# Patient Record
Sex: FEMALE | Race: BLACK OR AFRICAN AMERICAN | NOT HISPANIC OR LATINO | Employment: OTHER | URBAN - METROPOLITAN AREA
[De-identification: names, ages, dates, MRNs, and addresses within clinical notes are randomized per-mention and may not be internally consistent; named-entity substitution may affect disease eponyms.]

---

## 2024-04-18 ENCOUNTER — APPOINTMENT (OUTPATIENT)
Dept: RADIOLOGY | Facility: CLINIC | Age: 74
DRG: 282 | End: 2024-04-18
Attending: PHYSICIAN ASSISTANT
Payer: MEDICARE

## 2024-04-18 ENCOUNTER — HOSPITAL ENCOUNTER (INPATIENT)
Facility: CLINIC | Age: 74
LOS: 1 days | Discharge: ANOTHER HOSPITAL | DRG: 282 | End: 2024-04-19
Attending: EMERGENCY MEDICINE | Admitting: INTERNAL MEDICINE
Payer: MEDICARE

## 2024-04-18 DIAGNOSIS — I21.4 NSTEMI (NON-ST ELEVATED MYOCARDIAL INFARCTION) (H): Primary | ICD-10-CM

## 2024-04-18 DIAGNOSIS — R07.9 CHEST PAIN, UNSPECIFIED TYPE: ICD-10-CM

## 2024-04-18 DIAGNOSIS — E87.6 HYPOKALEMIA: ICD-10-CM

## 2024-04-18 PROBLEM — M17.11 PRIMARY OSTEOARTHRITIS OF RIGHT KNEE: Status: ACTIVE | Noted: 2018-11-28

## 2024-04-18 LAB
ALBUMIN SERPL BCG-MCNC: 4.1 G/DL (ref 3.5–5.2)
ALP SERPL-CCNC: 53 U/L (ref 40–150)
ALT SERPL W P-5'-P-CCNC: 8 U/L (ref 0–50)
ANION GAP SERPL CALCULATED.3IONS-SCNC: 13 MMOL/L (ref 7–15)
APTT PPP: 28 SECONDS (ref 22–38)
AST SERPL W P-5'-P-CCNC: 21 U/L (ref 0–45)
ATRIAL RATE - MUSE: 70 BPM
BILIRUB DIRECT SERPL-MCNC: <0.2 MG/DL (ref 0–0.3)
BILIRUB SERPL-MCNC: 0.3 MG/DL
BUN SERPL-MCNC: 21.5 MG/DL (ref 8–23)
CALCIUM SERPL-MCNC: 10.4 MG/DL (ref 8.8–10.2)
CHLORIDE SERPL-SCNC: 103 MMOL/L (ref 98–107)
CREAT SERPL-MCNC: 0.88 MG/DL (ref 0.51–0.95)
D DIMER PPP FEU-MCNC: 0.61 UG/ML FEU (ref 0–0.5)
DEPRECATED HCO3 PLAS-SCNC: 26 MMOL/L (ref 22–29)
DIASTOLIC BLOOD PRESSURE - MUSE: 86 MMHG
EGFRCR SERPLBLD CKD-EPI 2021: 70 ML/MIN/1.73M2
ERYTHROCYTE [DISTWIDTH] IN BLOOD BY AUTOMATED COUNT: 13.8 % (ref 10–15)
FLUAV RNA SPEC QL NAA+PROBE: NEGATIVE
FLUBV RNA RESP QL NAA+PROBE: NEGATIVE
GLUCOSE SERPL-MCNC: 114 MG/DL (ref 70–99)
HCT VFR BLD AUTO: 39.4 % (ref 35–47)
HGB BLD-MCNC: 13.3 G/DL (ref 11.7–15.7)
INR PPP: 0.9 (ref 0.85–1.15)
INTERPRETATION ECG - MUSE: NORMAL
LIPASE SERPL-CCNC: 28 U/L (ref 13–60)
MAGNESIUM SERPL-MCNC: 2 MG/DL (ref 1.7–2.3)
MCH RBC QN AUTO: 32 PG (ref 26.5–33)
MCHC RBC AUTO-ENTMCNC: 33.8 G/DL (ref 31.5–36.5)
MCV RBC AUTO: 95 FL (ref 78–100)
P AXIS - MUSE: 73 DEGREES
PLATELET # BLD AUTO: 229 10E3/UL (ref 150–450)
POTASSIUM SERPL-SCNC: 3.2 MMOL/L (ref 3.4–5.3)
PR INTERVAL - MUSE: 180 MS
PROT SERPL-MCNC: 7.5 G/DL (ref 6.4–8.3)
QRS DURATION - MUSE: 90 MS
QT - MUSE: 426 MS
QTC - MUSE: 460 MS
R AXIS - MUSE: -8 DEGREES
RBC # BLD AUTO: 4.15 10E6/UL (ref 3.8–5.2)
RSV RNA SPEC NAA+PROBE: NEGATIVE
SARS-COV-2 RNA RESP QL NAA+PROBE: NEGATIVE
SODIUM SERPL-SCNC: 142 MMOL/L (ref 135–145)
SYSTOLIC BLOOD PRESSURE - MUSE: 182 MMHG
T AXIS - MUSE: -8 DEGREES
TROPONIN T SERPL HS-MCNC: 14 NG/L
TROPONIN T SERPL HS-MCNC: 64 NG/L
TSH SERPL DL<=0.005 MIU/L-ACNC: 0.98 UIU/ML (ref 0.3–4.2)
VENTRICULAR RATE- MUSE: 70 BPM
WBC # BLD AUTO: 5.7 10E3/UL (ref 4–11)

## 2024-04-18 PROCEDURE — 85610 PROTHROMBIN TIME: CPT | Performed by: PHYSICIAN ASSISTANT

## 2024-04-18 PROCEDURE — 99223 1ST HOSP IP/OBS HIGH 75: CPT | Mod: AI | Performed by: INTERNAL MEDICINE

## 2024-04-18 PROCEDURE — 93005 ELECTROCARDIOGRAM TRACING: CPT | Performed by: PHYSICIAN ASSISTANT

## 2024-04-18 PROCEDURE — 82248 BILIRUBIN DIRECT: CPT | Performed by: EMERGENCY MEDICINE

## 2024-04-18 PROCEDURE — 120N000004 HC R&B MS OVERFLOW

## 2024-04-18 PROCEDURE — 36415 COLL VENOUS BLD VENIPUNCTURE: CPT | Performed by: PHYSICIAN ASSISTANT

## 2024-04-18 PROCEDURE — 258N000003 HC RX IP 258 OP 636: Performed by: INTERNAL MEDICINE

## 2024-04-18 PROCEDURE — 83880 ASSAY OF NATRIURETIC PEPTIDE: CPT | Performed by: INTERNAL MEDICINE

## 2024-04-18 PROCEDURE — 85027 COMPLETE CBC AUTOMATED: CPT | Performed by: PHYSICIAN ASSISTANT

## 2024-04-18 PROCEDURE — 71046 X-RAY EXAM CHEST 2 VIEWS: CPT

## 2024-04-18 PROCEDURE — 36415 COLL VENOUS BLD VENIPUNCTURE: CPT | Performed by: EMERGENCY MEDICINE

## 2024-04-18 PROCEDURE — 250N000011 HC RX IP 250 OP 636: Performed by: PHYSICIAN ASSISTANT

## 2024-04-18 PROCEDURE — 80048 BASIC METABOLIC PNL TOTAL CA: CPT | Performed by: PHYSICIAN ASSISTANT

## 2024-04-18 PROCEDURE — 99285 EMERGENCY DEPT VISIT HI MDM: CPT

## 2024-04-18 PROCEDURE — 84484 ASSAY OF TROPONIN QUANT: CPT | Performed by: PHYSICIAN ASSISTANT

## 2024-04-18 PROCEDURE — 87637 SARSCOV2&INF A&B&RSV AMP PRB: CPT | Performed by: PHYSICIAN ASSISTANT

## 2024-04-18 PROCEDURE — 250N000013 HC RX MED GY IP 250 OP 250 PS 637: Performed by: PHYSICIAN ASSISTANT

## 2024-04-18 PROCEDURE — 83735 ASSAY OF MAGNESIUM: CPT | Performed by: EMERGENCY MEDICINE

## 2024-04-18 PROCEDURE — 84484 ASSAY OF TROPONIN QUANT: CPT | Performed by: EMERGENCY MEDICINE

## 2024-04-18 PROCEDURE — 83690 ASSAY OF LIPASE: CPT | Performed by: EMERGENCY MEDICINE

## 2024-04-18 PROCEDURE — 85730 THROMBOPLASTIN TIME PARTIAL: CPT | Performed by: PHYSICIAN ASSISTANT

## 2024-04-18 PROCEDURE — 84443 ASSAY THYROID STIM HORMONE: CPT | Performed by: EMERGENCY MEDICINE

## 2024-04-18 PROCEDURE — 96365 THER/PROPH/DIAG IV INF INIT: CPT

## 2024-04-18 PROCEDURE — 85379 FIBRIN DEGRADATION QUANT: CPT | Performed by: PHYSICIAN ASSISTANT

## 2024-04-18 PROCEDURE — 96376 TX/PRO/DX INJ SAME DRUG ADON: CPT

## 2024-04-18 RX ORDER — LISINOPRIL AND HYDROCHLOROTHIAZIDE 12.5; 2 MG/1; MG/1
2 TABLET ORAL DAILY
Status: DISCONTINUED | OUTPATIENT
Start: 2024-04-19 | End: 2024-04-19 | Stop reason: HOSPADM

## 2024-04-18 RX ORDER — LIDOCAINE 40 MG/G
CREAM TOPICAL
Status: DISCONTINUED | OUTPATIENT
Start: 2024-04-18 | End: 2024-04-19 | Stop reason: HOSPADM

## 2024-04-18 RX ORDER — METOPROLOL TARTRATE 25 MG/1
25 TABLET, FILM COATED ORAL ONCE
Status: DISCONTINUED | OUTPATIENT
Start: 2024-04-18 | End: 2024-04-18

## 2024-04-18 RX ORDER — CALCIUM CARBONATE 500 MG/1
1000 TABLET, CHEWABLE ORAL 4 TIMES DAILY PRN
Status: DISCONTINUED | OUTPATIENT
Start: 2024-04-18 | End: 2024-04-19 | Stop reason: HOSPADM

## 2024-04-18 RX ORDER — POTASSIUM CHLORIDE 1500 MG/1
40 TABLET, EXTENDED RELEASE ORAL ONCE
Status: COMPLETED | OUTPATIENT
Start: 2024-04-18 | End: 2024-04-18

## 2024-04-18 RX ORDER — AMLODIPINE BESYLATE 5 MG/1
5 TABLET ORAL DAILY
Status: DISCONTINUED | OUTPATIENT
Start: 2024-04-19 | End: 2024-04-19 | Stop reason: HOSPADM

## 2024-04-18 RX ORDER — AMOXICILLIN 250 MG
2 CAPSULE ORAL 2 TIMES DAILY PRN
Status: DISCONTINUED | OUTPATIENT
Start: 2024-04-18 | End: 2024-04-19 | Stop reason: HOSPADM

## 2024-04-18 RX ORDER — ASPIRIN 81 MG/1
324 TABLET, CHEWABLE ORAL ONCE
Status: COMPLETED | OUTPATIENT
Start: 2024-04-18 | End: 2024-04-18

## 2024-04-18 RX ORDER — HEPARIN SODIUM 10000 [USP'U]/100ML
0-5000 INJECTION, SOLUTION INTRAVENOUS CONTINUOUS
Status: DISCONTINUED | OUTPATIENT
Start: 2024-04-18 | End: 2024-04-19 | Stop reason: HOSPADM

## 2024-04-18 RX ORDER — VITAMIN B COMPLEX
1000 TABLET ORAL
COMMUNITY
End: 2024-04-18

## 2024-04-18 RX ORDER — ASPIRIN 81 MG/1
162 TABLET, CHEWABLE ORAL DAILY
Status: DISCONTINUED | OUTPATIENT
Start: 2024-04-19 | End: 2024-04-19 | Stop reason: HOSPADM

## 2024-04-18 RX ORDER — AMLODIPINE BESYLATE 5 MG/1
1 TABLET ORAL DAILY
COMMUNITY
Start: 2024-02-27

## 2024-04-18 RX ORDER — LANOLIN ALCOHOL/MO/W.PET/CERES
CREAM (GRAM) TOPICAL
COMMUNITY

## 2024-04-18 RX ORDER — MELOXICAM 7.5 MG/1
7.5 TABLET ORAL DAILY PRN
Status: ON HOLD | COMMUNITY
Start: 2024-02-27 | End: 2024-04-20

## 2024-04-18 RX ORDER — SODIUM CHLORIDE, SODIUM LACTATE, POTASSIUM CHLORIDE, CALCIUM CHLORIDE 600; 310; 30; 20 MG/100ML; MG/100ML; MG/100ML; MG/100ML
INJECTION, SOLUTION INTRAVENOUS CONTINUOUS
Status: DISCONTINUED | OUTPATIENT
Start: 2024-04-18 | End: 2024-04-19 | Stop reason: HOSPADM

## 2024-04-18 RX ORDER — LISINOPRIL AND HYDROCHLOROTHIAZIDE 12.5; 2 MG/1; MG/1
2 TABLET ORAL DAILY
COMMUNITY
Start: 2024-02-27

## 2024-04-18 RX ORDER — NITROGLYCERIN 0.4 MG/1
0.4 TABLET SUBLINGUAL EVERY 5 MIN PRN
Status: DISCONTINUED | OUTPATIENT
Start: 2024-04-18 | End: 2024-04-19 | Stop reason: HOSPADM

## 2024-04-18 RX ORDER — ACETAMINOPHEN 650 MG/1
650 SUPPOSITORY RECTAL EVERY 4 HOURS PRN
Status: DISCONTINUED | OUTPATIENT
Start: 2024-04-18 | End: 2024-04-19 | Stop reason: HOSPADM

## 2024-04-18 RX ORDER — LANOLIN ALCOHOL/MO/W.PET/CERES
1000 CREAM (GRAM) TOPICAL DAILY
Status: DISCONTINUED | OUTPATIENT
Start: 2024-04-19 | End: 2024-04-19 | Stop reason: HOSPADM

## 2024-04-18 RX ORDER — ACETAMINOPHEN 325 MG/1
650 TABLET ORAL EVERY 4 HOURS PRN
Status: DISCONTINUED | OUTPATIENT
Start: 2024-04-18 | End: 2024-04-19 | Stop reason: HOSPADM

## 2024-04-18 RX ORDER — AMOXICILLIN 250 MG
1 CAPSULE ORAL 2 TIMES DAILY PRN
Status: DISCONTINUED | OUTPATIENT
Start: 2024-04-18 | End: 2024-04-19 | Stop reason: HOSPADM

## 2024-04-18 RX ADMIN — HEPARIN SODIUM 1100 UNITS/HR: 10000 INJECTION, SOLUTION INTRAVENOUS at 21:08

## 2024-04-18 RX ADMIN — ASPIRIN 81 MG CHEWABLE TABLET 324 MG: 81 TABLET CHEWABLE at 18:09

## 2024-04-18 RX ADMIN — POTASSIUM CHLORIDE 40 MEQ: 1500 TABLET, EXTENDED RELEASE ORAL at 19:08

## 2024-04-18 RX ADMIN — SODIUM CHLORIDE, POTASSIUM CHLORIDE, SODIUM LACTATE AND CALCIUM CHLORIDE: 600; 310; 30; 20 INJECTION, SOLUTION INTRAVENOUS at 23:00

## 2024-04-18 RX ADMIN — METOPROLOL TARTRATE 12.5 MG: 25 TABLET, FILM COATED ORAL at 22:16

## 2024-04-18 ASSESSMENT — ACTIVITIES OF DAILY LIVING (ADL)
ADLS_ACUITY_SCORE: 35
ADLS_ACUITY_SCORE: 35
ADLS_ACUITY_SCORE: 21
ADLS_ACUITY_SCORE: 35
ADLS_ACUITY_SCORE: 35

## 2024-04-18 ASSESSMENT — COLUMBIA-SUICIDE SEVERITY RATING SCALE - C-SSRS
2. HAVE YOU ACTUALLY HAD ANY THOUGHTS OF KILLING YOURSELF IN THE PAST MONTH?: NO
6. HAVE YOU EVER DONE ANYTHING, STARTED TO DO ANYTHING, OR PREPARED TO DO ANYTHING TO END YOUR LIFE?: NO
1. IN THE PAST MONTH, HAVE YOU WISHED YOU WERE DEAD OR WISHED YOU COULD GO TO SLEEP AND NOT WAKE UP?: NO

## 2024-04-18 NOTE — ED PROVIDER NOTES
EMERGENCY DEPARTMENT ENCOUNTER      NAME: Angelica Wright  AGE: 73 year old female  YOB: 1950  MRN: [id number]  EVALUATION DATE & TIME: No admission date for patient encounter.    PCP: No primary care provider on file.        Chief Complaint   Patient presents with    Chest Pain    Shortness of Breath         IMPRESSION  1. NSTEMI (non-ST elevated myocardial infarction) (H)    2. Chest pain, unspecified type    3. Hypokalemia        PLAN  - admit to hospitalist for further care with cardiology consulting; cards tele admit      ED COURSE & MEDICAL DECISION MAKING    ED Course as of 04/18/24 2140   Thu Apr 18, 2024 1921 CXR independently reviewed & interpreted by me: no acute cardiopulmonary process.       6:24 PM I was consulted by Kendy Ontiveros PA-C, on this patient. I reviewed ED presentation history, assessment, management, plan to this point. Please see ED MAX note for details.    Patient seen in the waiting room due to boarding crisis.      Briefly, 73yoF with history of HTN, HLD presenting with about 1 hour of chest pain radiating to left arm; found to have NSTEMI. No ischemic changes on EKG but high-sensitivity troponin increased from 14 to 64; no ongoing chest pain. PE ruled out with age-adjusted d-dimer within normal limits. CXR with no acute abnormality or explanatory pathology. Doubt acute aortic syndrome. Given Aspirin & heparin. Cardiology states can be admitted here at [facility]. Admission to hospitalist arranged per ED PA; please see her note for details.        This patient involved a high degree of complexity in medical decision making, as significant risks were present and assessed. Recent encounters & results in medical record reviewed by me.    All workup (i.e. any EKG/labs/imaging as per charting below) reviewed and independently interpreted by me. See respective sections for details.      I had a face to face encounter with this patient seen by the Advanced Practice Provider  "(MAX). I personally made/approved the management plan and take responsibility for the patient management. I personally saw patient and performed a substantive portion of the visit including all aspects of the medical decision making.    MEDICATIONS GIVEN IN THE EMERGENCY DEPARTMENT  Medications   nitroGLYcerin (NITROSTAT) sublingual tablet 0.4 mg (has no administration in time range)   heparin 25,000 units in 0.45% NaCl 250 mL ANTICOAGULANT infusion (1,100 Units/hr Intravenous $New Bag 4/18/24 2108)   metoprolol tartrate (LOPRESSOR) half-tab 12.5 mg (has no administration in time range)   aspirin (ASA) chewable tablet 324 mg (324 mg Oral $Given 4/18/24 1809)   potassium chloride kal ER (KLOR-CON M20) CR tablet 40 mEq (40 mEq Oral $Given 4/18/24 1908)   heparin loading dose for LOW INTENSITY TREATMENT * Give BEFORE starting heparin infusion (5,500 Units Intravenous $Given 4/18/24 2108)               =================================================================      HPI  Use of : N/A        Angelica Wright is a 73 year old female with a pertinent history of hypertension, hyperlipidemia, and diverticulosis who presents to this ED via private car with  for evaluation of a sudden onset of shortness of breath and chest pain.    Patient endorses a 6/10 substernal \"squeezing and burning, chest pain that radiates to her left hand.  She endorses having associated nausea, shortness of breath, dizziness.    Otherwise in normal state of health. No further concerns at this time.       --------------- MEDICAL HISTORY ---------------  PAST MEDICAL HISTORY:  Reviewed by me.  History reviewed. No pertinent past medical history.  Patient Active Problem List   Diagnosis    Diverticulosis of large intestine    Essential hypertension    Mixed hyperlipidemia    Personal history of colonic polyps    Primary osteoarthritis of right knee    Hypokalemia    NSTEMI (non-ST elevated myocardial infarction) (H)    Chest pain, " unspecified type       PAST SURGICAL HISTORY:  Reviewed by me.  History reviewed. No pertinent surgical history.    CURRENT MEDICATIONS:    Reviewed by me.    Current Facility-Administered Medications:     heparin 25,000 units in 0.45% NaCl 250 mL ANTICOAGULANT infusion, 0-5,000 Units/hr, Intravenous, Continuous, Kendy Ontiveros PA-C, Last Rate: 11 mL/hr at 04/18/24 2108, 1,100 Units/hr at 04/18/24 2108    metoprolol tartrate (LOPRESSOR) half-tab 12.5 mg, 12.5 mg, Oral, Once, Kendy Ontiveros PA-C    nitroGLYcerin (NITROSTAT) sublingual tablet 0.4 mg, 0.4 mg, Sublingual, Q5 Min PRN, Kendy Ontiveros PA-C    Current Outpatient Medications:     amLODIPine (NORVASC) 5 MG tablet, Take 1 tablet by mouth daily In the evening, Disp: , Rfl:     cyanocobalamin (VITAMIN B-12) 1000 MCG tablet, Take by mouth once daily., Disp: , Rfl:     lisinopril-hydrochlorothiazide (ZESTORETIC) 20-12.5 MG tablet, Take 2 tablets by mouth daily In the evening, Disp: , Rfl:     meloxicam (MOBIC) 7.5 MG tablet, Take 7.5 mg by mouth daily as needed for pain, Disp: , Rfl:     ALLERGIES:  Reviewed by me.  No Known Allergies    FAMILY HISTORY:  Reviewed by me.  History reviewed. No pertinent family history.    SOCIAL HISTORY:   Reviewed by me.  Social History     Socioeconomic History    Marital status:      Spouse name: None    Number of children: None    Years of education: None    Highest education level: None         --------------- PHYSICAL EXAM ---------------  Nursing notes and vitals independently reviewed by me.  VITALS:  Vitals:    04/18/24 2000 04/18/24 2015 04/18/24 2045 04/18/24 2100   BP:    (!) 162/77   Pulse: 67 65 63 66   Resp: 26 18 26 23   Temp:       TempSrc:       SpO2: 95% 95% 97% 97%   Weight:       Height:           PHYSICAL EXAM:    General:  alert, interactive, no distress  Eyes:  conjunctivae clear, conjugate gaze  HENT:  atraumatic, nose with no rhinorrhea, oropharynx clear  Neck:   no meningismus  Cardiovascular:  HR 60s during exam, regular rhythm, no murmurs, brisk cap refill  Chest:  no chest wall tenderness  Pulmonary:  no stridor, normal phonation, normal work of breathing, clear lungs bilaterally  Abdomen: soft, nondistended, nontender  :  no CVA tenderness  Back:  no midline spinal tenderness  Musculoskeletal:  no pretibial edema, no calf tenderness. Gross ROM intact to joints of extremities with no obvious deformities.  Skin:  warm, dry, no rash  Neuro:  awake, alert, answers questions appropriately, follows commands, moves all limbs  Psych:  calm, normal affect      --------------- RESULTS ---------------  EKG:    Reviewed and independently interpreted by me.  - NSR at 70bpm, no ST changes, small symmetric TWI in III, normal intervals  - no priors for comparison  My read.    LAB:  Reviewed and independently interpreted by me.  Results for orders placed or performed during the hospital encounter of 04/18/24   Chest XR,  PA & LAT    Impression    IMPRESSION: Negative chest.   CBC (+ platelets, no diff)   Result Value Ref Range    WBC Count 5.7 4.0 - 11.0 10e3/uL    RBC Count 4.15 3.80 - 5.20 10e6/uL    Hemoglobin 13.3 11.7 - 15.7 g/dL    Hematocrit 39.4 35.0 - 47.0 %    MCV 95 78 - 100 fL    MCH 32.0 26.5 - 33.0 pg    MCHC 33.8 31.5 - 36.5 g/dL    RDW 13.8 10.0 - 15.0 %    Platelet Count 229 150 - 450 10e3/uL   Basic metabolic panel   Result Value Ref Range    Sodium 142 135 - 145 mmol/L    Potassium 3.2 (L) 3.4 - 5.3 mmol/L    Chloride 103 98 - 107 mmol/L    Carbon Dioxide (CO2) 26 22 - 29 mmol/L    Anion Gap 13 7 - 15 mmol/L    Urea Nitrogen 21.5 8.0 - 23.0 mg/dL    Creatinine 0.88 0.51 - 0.95 mg/dL    GFR Estimate 70 >60 mL/min/1.73m2    Calcium 10.4 (H) 8.8 - 10.2 mg/dL    Glucose 114 (H) 70 - 99 mg/dL   Result Value Ref Range    INR 0.90 0.85 - 1.15   Result Value Ref Range    aPTT 28 22 - 38 Seconds   Troponin T, High Sensitivity (now)   Result Value Ref Range    Troponin T,  High Sensitivity 14 <=14 ng/L   Symptomatic Influenza A/B, RSV, & SARS-CoV2 PCR (COVID-19) Nasopharyngeal    Specimen: Nasopharyngeal; Swab   Result Value Ref Range    Influenza A PCR Negative Negative    Influenza B PCR Negative Negative    RSV PCR Negative Negative    SARS CoV2 PCR Negative Negative   D dimer quantitative   Result Value Ref Range    D-Dimer Quantitative 0.61 (H) 0.00 - 0.50 ug/mL FEU   Hepatic function panel   Result Value Ref Range    Protein Total 7.5 6.4 - 8.3 g/dL    Albumin 4.1 3.5 - 5.2 g/dL    Bilirubin Total 0.3 <=1.2 mg/dL    Alkaline Phosphatase 53 40 - 150 U/L    AST 21 0 - 45 U/L    ALT 8 0 - 50 U/L    Bilirubin Direct <0.20 0.00 - 0.30 mg/dL   Result Value Ref Range    Magnesium 2.0 1.7 - 2.3 mg/dL   TSH with free T4 reflex   Result Value Ref Range    TSH 0.98 0.30 - 4.20 uIU/mL   Result Value Ref Range    Lipase 28 13 - 60 U/L   Result Value Ref Range    Troponin T, High Sensitivity 64 (H) <=14 ng/L   ECG 12-LEAD WITH MUSE (LHE)   Result Value Ref Range    Systolic Blood Pressure 182 mmHg    Diastolic Blood Pressure 86 mmHg    Ventricular Rate 70 BPM    Atrial Rate 70 BPM    IN Interval 180 ms    QRS Duration 90 ms     ms    QTc 460 ms    P Axis 73 degrees    R AXIS -8 degrees    T Axis -8 degrees    Interpretation ECG       Sinus rhythm  Voltage criteria for left ventricular hypertrophy  Nonspecific ST and T wave abnormality  Abnormal ECG  No previous ECGs available  Confirmed by SEE ED PROVIDER NOTE FOR, ECG INTERPRETATION ([id number]),  VIOLET SAMUELS ([id number]) on 4/18/2024 6:11:48 PM         RADIOLOGY:  Reviewed and independently interpreted by me. Please see official radiology report.  Recent Results (from the past 24 hour(s))   Chest XR,  PA & LAT    Narrative    EXAM: XR CHEST 2 VIEWS  LOCATION: [facility]  DATE: 4/18/2024    INDICATION: chest pain  COMPARISON: None.      Impression    IMPRESSION: Negative chest.       PROCEDURES:   I was present  for the key portions of procedures documented in MAX/midlevel note, see midlevel note for further details. See additional below as well.  Procedures   --------------------------------------------------------------------------------   Cardiac telemetry monitoring ordered by me secondary to the patient's history of chest pain and to monitor the patient for dysrhythmia. Reviewed & independently interpreted by me. Revealed normal sinus rhythm.  --------------------------------------------------------------------------------     Critical Care     Performed by:   George Tolentino MD   Authorized by:   George Tolentino MD  Total critical care time: 35 minutes (Critical care time was exclusive of separately billable procedures and treating other patients.)    Critical care was necessary to treat or prevent imminent or life-threatening deterioration of the following conditions: NSTEMI requiring Aspirin, heparin, telemetry monitoring, cardiology consultation, admission    Critical care was time spent personally by me on the following activities:  - obtaining history from patient or surrogate  - examination of patient  - development of treatment plan with patient or surrogate  - ordering and performing treatments and interventions  - ordering and review of laboratory studies  - ordering and review of radiographic studies  - re-evaluation of patient's condition  - monitoring for potential decompensation  - discussion with consultants  ---------------------------------------------------------------------------------------------------------------------  ---------------------------------------------------------------------------------------------------------------------            --------------- ADDITIONAL MDM ---------------  History:  - I considered systemic symptoms of the presenting illness.  - Supplemental history from:       -- patient  - External Record(s) reviewed:       -- Inpatient/outpatient record, prior labs, prior  imaging       -- see above ED course & MDM for further details    Workup:  - Chart documentation above includes differential considered and any EKGs or imaging independently interpreted by provider.  - In additional to work up documented, I considered the following work up:       -- see above ED course & MDM for further details    External Consultation:  - Discussion of management with another provider:       -- see above charting for additional    Complicating Factors:  - Care impacted by chronic illness:       -- see above MDM, past medical history, & problem list  - Care affected by social determinants of health:       -- see above social history       -- Access to Affordable Healthcare    Disposition Considerations:  - Admit             I, Angy Louis, am serving as a scribe to document services personally performed by Dr. George Tolentino based on my observation and the provider's statements to me. I, George Tolentino MD attest that Angy Louis is acting in a scribe capacity, has observed my performance of the services and has documented them in accordance with my direction.      George Tolentino MD  04/18/24  Emergency Medicine  [facility] EMERGENCY ROOM  [address] [place] [address]  [phone]  Dept: [phone]       George Tolentino MD  04/18/24 1880

## 2024-04-18 NOTE — ED TRIAGE NOTES
"  Pt reports 6/10 substernal squeezing/burning chest pain radiating to left hand, +nausea, +SOB, dizziness. HX HTN. Pt thinks she had tonsillitis 2 weeks ago. Denies cough, fever.ABC intact.    BP (!) 182/86   Pulse 68   Temp 98  F (36.7  C) (Oral)   Resp 18   Ht 1.702 m (5' 7\")   Wt 91.6 kg (202 lb)   SpO2 98%   BMI 31.64 kg/m         Triage Assessment (Adult)       Row Name 04/18/24 1741          Triage Assessment    Airway WDL WDL;all        Respiratory WDL    Respiratory WDL all     Rhythm/Pattern, Respiratory unlabored;shortness of breath        Peripheral/Neurovascular WDL    Peripheral Neurovascular WDL WDL        Cognitive/Neuro/Behavioral WDL    Cognitive/Neuro/Behavioral WDL WDL                     "

## 2024-04-18 NOTE — ED PROVIDER NOTES
Emergency Department Encounter   NAME: Angelica Wright ; AGE: 73 year old female ; YOB: 1950 ; MRN: [id number] ; PCP: No primary care provider on file.   ED PROVIDER: Kendy Ontiveros PA-C    Evaluation Date & Time:   No admission date for patient encounter.    CHIEF COMPLAINT:  Chest Pain and Shortness of Breath      Impression and Plan   MDM:   Angelica Wright is a 73 year old female with a pertinent history of HTN who presents to the ED by private vehicle for evaluation of chest pain and shortness of breath.  The patient presents to the emergency department for evaluation of burning and squeezing chest discomfort at 4 PM today with associated nausea and shortness of breath.  She did have radiation of numbness into her left arm which has now resolved.  Pain is currently 5 out of 10.  Upon arrival to the ER, she is afebrile vitally stable though hypertensive to 182/86.  She is well-appearing at this time, breathing comfortably, no pallor or diaphoresis.  Exam overall reassuring.  Certainly given her age and presentation, ACS is high on the differential, and we discussed plan for EKG and troponin, did consider other etiologies including anxiety, panic attack, GERD with esophagitis, referred abdominal pain, PE, dissection.  Labs including D-dimer and hepatic panel ordered.  No ripping or tearing pain, pulse or pressure discrepancies, or radiation into her back to suggest dissection.  324 mg of aspirin ordered and will trial SL nitro to see if this improves her pain.    EKG shows sinus rhythm, voltage criteria for left ventricular hypertrophy.  Nonspecific T wave inversion in V1 and lead III.  No ST elevation or depression.  Initial troponin returned at 14.  Coags within normal limits.  Labs notable for a potassium of 3.2 -replaced orally.  Mild hypercalcemia and hyperglycemia.  Age-adjusted D-dimer within normal limits.  No further PE workup indicated.  LFTs and lipase reassuring -low suspicion for  pancreatitis, acute cholecystitis, choledocholithiasis, or symptomatic cholelithiasis.  TSH within normal limits.  Viral swab negative.  Chest x-ray negative.  Delta troponin returned elevated at 64 concerning for NSTEMI given presentation. Heparin drip initiated.  On patient reassessment, she states that her pain is completely resolved.  It resolved prior to receiving any nitro.  I discussed the case with cardiology, Dr. Guy. Does not feel that patient warrants any emergent procedure this evening, and feels that she can be admitted here at [facility]. Advised 25mg of Metroprolol tartrate q6 hours PO (hold for systolic BP less than 110 or HR less than 60bpm), keep her NPO, and will consult in the AM. Discussed the case with Dr. Patterson who accepts the patient for cardiac tele admission. Patient and  are comfortable with the plan.     Critical Care  Performed by: Kendy Soto PA-C  Authorized by: Kendy Soto PA-C    Total critical care time: 35 minutes  Critical care time was exclusive of separately billable procedures and treating other patients.  Critical care was necessary to treat or prevent imminent or life-threatening deterioration of the following conditions: NSTEMI  Critical care was time spent personally by me on the following activities: development of treatment plan with patient or surrogate, discussions with consultants, examination of patient, evaluation of patient's response to treatment, obtaining history from patient or surrogate, ordering and performing treatments and interventions, ordering and review of laboratory studies, ordering and review of radiographic studies and re-evaluation of patient's condition, this excludes any separately billable procedures.      Medical Decision Making  Obtained supplemental history:Supplemental history obtained?: Yes- friend   Reviewed external records: External records reviewed?: Documented in chart  Care impacted by chronic  illness:Hypertension  Care significantly affected by social determinants of health:Access to Medical Care  Did you consider but not order tests?: Work up considered but not performed and documented in chart, if applicable  Did you interpret images independently?: Independent interpretation of ECG and images noted in documentation, when applicable.  Consultation discussion with other provider:Did you involve another provider (consultant, , pharmacy, etc.)?: I discussed the care with another health care provider, see documentation for details.  Admit.    ED COURSE:  6:09 PM I met and introduced myself to the patient. I gathered initial history and performed my physical exam. We discussed plan for initial workup.   6:28 PM I have staffed the patient with Dr. Tolentino ED MD, who has evaluated the patient and agrees with all aspects of today's care.   8:57 PM I rechecked the patient and discussed results and plans for admission. I paged cardiology.  9:16 PM Spoke with Cardiology, Dr. Guy.  9:29 PM Spoke with the hospitalist Dr. Patterson who accepts the patient for admission.  9:31 PM Reassessed and updated the patient on admission status.    At the conclusion of the encounter I discussed the results of all the tests and the disposition. The questions were answered. The patient or family acknowledged understanding and was agreeable with the care plan.    FINAL IMPRESSION:    ICD-10-CM    1. Chest pain, unspecified type  R07.9       2. Hypokalemia  E87.6       3. NSTEMI (non-ST elevated myocardial infarction) (H)  I21.4             MEDICATIONS GIVEN IN THE EMERGENCY DEPARTMENT:  Medications   nitroGLYcerin (NITROSTAT) sublingual tablet 0.4 mg (has no administration in time range)   heparin 25,000 units in 0.45% NaCl 250 mL ANTICOAGULANT infusion (1,100 Units/hr Intravenous $New Bag 4/18/24 0439)   metoprolol tartrate (LOPRESSOR) tablet 25 mg (has no administration in time range)   aspirin (ASA) chewable tablet 324 mg  (324 mg Oral $Given 4/18/24 1809)   potassium chloride kal ER (KLOR-CON M20) CR tablet 40 mEq (40 mEq Oral $Given 4/18/24 1908)   heparin loading dose for LOW INTENSITY TREATMENT * Give BEFORE starting heparin infusion (5,500 Units Intravenous $Given 4/18/24 2108)         NEW PRESCRIPTIONS STARTED AT TODAY'S ED VISIT:  New Prescriptions    No medications on file         HPI   Use of Intrepreter: N/A     Angelica Wright is a 73 year old female with a pertinent history of HTN who presents to the ED by private vehicle for evaluation of chest pain and shortness of breath.    Patient reports at 4 PM today, she was getting ready to go to a Birthday dinner party and was getting a card when she felt sudden onset localized mid sternal chest pain. She describes the pain as a constant burning/squeezing sensation and has never felt this before. She later sat down to see if symptoms would go away but it continued to persist and get worse. At it's most severe time, the pain was a 7-8/10 and she had shortness of breath with this (better currently). She associates nausea, dizziness, and tingling in her left 3rd and 4th digit initially which has all since resolved. On the way to the ED, she noticed her heart rate was 92-99 bpm when her baseline heart rate is 52 bpm. Currently, the pain is a 4-5/10 and is improving. She denies history of cardiac problems, DM2, or HLD. She does have some bilateral lower extremity edema. She walks regularly but denies any chest pain or shortness of breath with exertion.    She otherwise denies associating jaw pain, back pain, or leg pain. There were no other concerns/complaints at this time.    Shx: She does walk regularly. She denies history of smoking.   Fhx: Her mother and grandmother have history of stroke. Her grandmother did have a pacemaker.      REVIEW OF SYSTEMS:  Pertinent positive and negative symptoms per HPI.       Medical History     History reviewed. No pertinent past medical  "history.    History reviewed. No pertinent surgical history.    History reviewed. No pertinent family history.         amLODIPine (NORVASC) 5 MG tablet  cyanocobalamin (VITAMIN B-12) 1000 MCG tablet  lisinopril-hydrochlorothiazide (ZESTORETIC) 20-12.5 MG tablet  meloxicam (MOBIC) 7.5 MG tablet          Physical Exam     First Vitals:  Patient Vitals for the past 24 hrs:   BP Temp Temp src Pulse Resp SpO2 Height Weight   04/18/24 2100 (!) 162/77 -- -- 66 23 97 % -- --   04/18/24 2045 -- -- -- 63 26 97 % -- --   04/18/24 2015 -- -- -- 65 18 95 % -- --   04/18/24 2000 -- -- -- 67 26 95 % -- --   04/18/24 1945 -- -- -- 64 15 94 % -- --   04/18/24 1915 (!) 158/76 -- -- 67 24 97 % -- --   04/18/24 1850 (!) 172/82 -- -- 65 24 94 % -- --   04/18/24 1745 (!) 182/86 98  F (36.7  C) Oral 68 18 98 % 1.702 m (5' 7\") 91.6 kg (202 lb)         PHYSICAL EXAM:   Physical Exam  Vitals reviewed.   Constitutional:       General: She is not in acute distress.     Appearance: She is not toxic-appearing or diaphoretic.   HENT:      Head: Normocephalic.   Cardiovascular:      Rate and Rhythm: Normal rate and regular rhythm.      Pulses:           Radial pulses are 2+ on the right side and 2+ on the left side.      Heart sounds: Normal heart sounds. Heart sounds not distant. No murmur heard.  Pulmonary:      Effort: Pulmonary effort is normal.      Breath sounds: Normal breath sounds.   Chest:      Chest wall: No tenderness.   Abdominal:      General: Bowel sounds are normal.      Palpations: Abdomen is soft.      Tenderness: There is no abdominal tenderness. There is no guarding or rebound.   Musculoskeletal:      Cervical back: Normal range of motion and neck supple.      Right lower leg: No tenderness. No edema.      Left lower leg: No tenderness. Edema (mild swelling to left ankle; non-pitting) present.   Neurological:      Mental Status: She is alert.             Results     LAB:  All pertinent labs reviewed and interpreted  Labs " Ordered and Resulted from Time of ED Arrival to Time of ED Departure   BASIC METABOLIC PANEL - Abnormal       Result Value    Sodium 142      Potassium 3.2 (*)     Chloride 103      Carbon Dioxide (CO2) 26      Anion Gap 13      Urea Nitrogen 21.5      Creatinine 0.88      GFR Estimate 70      Calcium 10.4 (*)     Glucose 114 (*)    D DIMER QUANTITATIVE - Abnormal    D-Dimer Quantitative 0.61 (*)    TROPONIN T, HIGH SENSITIVITY - Abnormal    Troponin T, High Sensitivity 64 (*)    CBC WITH PLATELETS - Normal    WBC Count 5.7      RBC Count 4.15      Hemoglobin 13.3      Hematocrit 39.4      MCV 95      MCH 32.0      MCHC 33.8      RDW 13.8      Platelet Count 229     INR - Normal    INR 0.90     PARTIAL THROMBOPLASTIN TIME - Normal    aPTT 28     TROPONIN T, HIGH SENSITIVITY - Normal    Troponin T, High Sensitivity 14     INFLUENZA A/B, RSV, & SARS-COV2 PCR - Normal    Influenza A PCR Negative      Influenza B PCR Negative      RSV PCR Negative      SARS CoV2 PCR Negative     HEPATIC FUNCTION PANEL - Normal    Protein Total 7.5      Albumin 4.1      Bilirubin Total 0.3      Alkaline Phosphatase 53      AST 21      ALT 8      Bilirubin Direct <0.20     MAGNESIUM - Normal    Magnesium 2.0     TSH WITH FREE T4 REFLEX - Normal    TSH 0.98     LIPASE - Normal    Lipase 28     HEPARIN UNFRACTIONATED ANTI XA LEVEL       RADIOLOGY:  Chest XR,  PA & LAT   Final Result   IMPRESSION: Negative chest.            ECG:    Performed at: 17;48:24    Impression: Sinus rhythm.  Voltage criteria for left ventricular hypertrophy.  Nonspecific T wave inversion in lead III and V1.    Rate: 70  Rhythm: Sinus  Axis: 73 -8 -8  HI Interval: 180  QRS Interval: 90  QTc Interval: 460  ST Changes: none  Comparison: none    EKG results reviewed and interpreted by Dr. Tolentino, ED MD.     PROCEDURES:  None      Radha MONDRAGON, am serving as a scribe to document services personally performed by Kendy Ontiveros PA-C, based on my observation and the  provider's statements to me. I, Kendy Ontiveros PA-C attest that Radha Naik is acting in a scribe capacity, has observed my performance of the services and has documented them in accordance with my direction.       Kendy Ontiveros PA-C   Emergency Medicine   [facility] EMERGENCY ROOM       Kendy Ontiveros PA-C  04/18/24 9325

## 2024-04-19 ENCOUNTER — HOSPITAL ENCOUNTER (INPATIENT)
Facility: HOSPITAL | Age: 74
LOS: 1 days | Discharge: HOME | DRG: 282 | End: 2024-04-20
Attending: INTERNAL MEDICINE | Admitting: INTERNAL MEDICINE
Payer: MEDICARE

## 2024-04-19 ENCOUNTER — APPOINTMENT (OUTPATIENT)
Dept: ULTRASOUND IMAGING | Facility: CLINIC | Age: 74
DRG: 282 | End: 2024-04-19
Attending: INTERNAL MEDICINE
Payer: MEDICARE

## 2024-04-19 ENCOUNTER — APPOINTMENT (OUTPATIENT)
Dept: CARDIOLOGY | Facility: CLINIC | Age: 74
DRG: 282 | End: 2024-04-19
Attending: INTERNAL MEDICINE
Payer: MEDICARE

## 2024-04-19 VITALS
TEMPERATURE: 97.5 F | SYSTOLIC BLOOD PRESSURE: 161 MMHG | WEIGHT: 199.1 LBS | HEART RATE: 57 BPM | DIASTOLIC BLOOD PRESSURE: 79 MMHG | BODY MASS INDEX: 31.25 KG/M2 | RESPIRATION RATE: 18 BRPM | HEIGHT: 67 IN | OXYGEN SATURATION: 98 %

## 2024-04-19 DIAGNOSIS — I21.4 NSTEMI (NON-ST ELEVATED MYOCARDIAL INFARCTION) (H): ICD-10-CM

## 2024-04-19 LAB
ABO/RH(D): NORMAL
ACT BLD: 228 SECONDS (ref 74–150)
ALBUMIN SERPL BCG-MCNC: 3.4 G/DL (ref 3.5–5.2)
ALP SERPL-CCNC: 44 U/L (ref 40–150)
ALT SERPL W P-5'-P-CCNC: 8 U/L (ref 0–50)
ANION GAP SERPL CALCULATED.3IONS-SCNC: 9 MMOL/L (ref 7–15)
ANTIBODY SCREEN: NEGATIVE
AST SERPL W P-5'-P-CCNC: 27 U/L (ref 0–45)
BASOPHILS # BLD AUTO: 0 10E3/UL (ref 0–0.2)
BASOPHILS NFR BLD AUTO: 0 %
BILIRUB SERPL-MCNC: 0.4 MG/DL
BUN SERPL-MCNC: 17.8 MG/DL (ref 8–23)
CALCIUM SERPL-MCNC: 9.1 MG/DL (ref 8.8–10.2)
CHLORIDE SERPL-SCNC: 110 MMOL/L (ref 98–107)
CHOLEST SERPL-MCNC: 230 MG/DL
CREAT SERPL-MCNC: 0.65 MG/DL (ref 0.51–0.95)
DEPRECATED HCO3 PLAS-SCNC: 23 MMOL/L (ref 22–29)
EGFRCR SERPLBLD CKD-EPI 2021: >90 ML/MIN/1.73M2
EOSINOPHIL # BLD AUTO: 0.1 10E3/UL (ref 0–0.7)
EOSINOPHIL NFR BLD AUTO: 1 %
ERYTHROCYTE [DISTWIDTH] IN BLOOD BY AUTOMATED COUNT: 13.9 % (ref 10–15)
GLUCOSE SERPL-MCNC: 106 MG/DL (ref 70–99)
HCT VFR BLD AUTO: 36.8 % (ref 35–47)
HDLC SERPL-MCNC: 60 MG/DL
HGB BLD-MCNC: 12.5 G/DL (ref 11.7–15.7)
IMM GRANULOCYTES # BLD: 0 10E3/UL
IMM GRANULOCYTES NFR BLD: 0 %
LDLC SERPL CALC-MCNC: 161 MG/DL
LYMPHOCYTES # BLD AUTO: 2.6 10E3/UL (ref 0.8–5.3)
LYMPHOCYTES NFR BLD AUTO: 34 %
MCH RBC QN AUTO: 32.1 PG (ref 26.5–33)
MCHC RBC AUTO-ENTMCNC: 34 G/DL (ref 31.5–36.5)
MCV RBC AUTO: 95 FL (ref 78–100)
MONOCYTES # BLD AUTO: 0.8 10E3/UL (ref 0–1.3)
MONOCYTES NFR BLD AUTO: 10 %
NEUTROPHILS # BLD AUTO: 4 10E3/UL (ref 1.6–8.3)
NEUTROPHILS NFR BLD AUTO: 54 %
NONHDLC SERPL-MCNC: 170 MG/DL
NRBC # BLD AUTO: 0 10E3/UL
NRBC BLD AUTO-RTO: 0 /100
NT-PROBNP SERPL-MCNC: 78 PG/ML (ref 0–900)
PLATELET # BLD AUTO: 191 10E3/UL (ref 150–450)
POTASSIUM SERPL-SCNC: 3.7 MMOL/L (ref 3.4–5.3)
PROT SERPL-MCNC: 6.3 G/DL (ref 6.4–8.3)
RBC # BLD AUTO: 3.89 10E6/UL (ref 3.8–5.2)
SODIUM SERPL-SCNC: 142 MMOL/L (ref 135–145)
SPECIMEN EXPIRATION DATE: NORMAL
TRIGL SERPL-MCNC: 43 MG/DL
TROPONIN T SERPL HS-MCNC: 181 NG/L
UFH PPP CHRO-ACNC: 0.71 IU/ML
WBC # BLD AUTO: 7.5 10E3/UL (ref 4–11)

## 2024-04-19 PROCEDURE — 258N000003 HC RX IP 258 OP 636: Performed by: INTERNAL MEDICINE

## 2024-04-19 PROCEDURE — 99232 SBSQ HOSP IP/OBS MODERATE 35: CPT | Mod: 25 | Performed by: STUDENT IN AN ORGANIZED HEALTH CARE EDUCATION/TRAINING PROGRAM

## 2024-04-19 PROCEDURE — 4A023N7 MEASUREMENT OF CARDIAC SAMPLING AND PRESSURE, LEFT HEART, PERCUTANEOUS APPROACH: ICD-10-PCS | Performed by: INTERNAL MEDICINE

## 2024-04-19 PROCEDURE — 93458 L HRT ARTERY/VENTRICLE ANGIO: CPT | Performed by: INTERNAL MEDICINE

## 2024-04-19 PROCEDURE — 36415 COLL VENOUS BLD VENIPUNCTURE: CPT | Performed by: INTERNAL MEDICINE

## 2024-04-19 PROCEDURE — 80053 COMPREHEN METABOLIC PANEL: CPT | Performed by: INTERNAL MEDICINE

## 2024-04-19 PROCEDURE — 93458 L HRT ARTERY/VENTRICLE ANGIO: CPT | Mod: 26 | Performed by: INTERNAL MEDICINE

## 2024-04-19 PROCEDURE — 250N000013 HC RX MED GY IP 250 OP 250 PS 637: Performed by: STUDENT IN AN ORGANIZED HEALTH CARE EDUCATION/TRAINING PROGRAM

## 2024-04-19 PROCEDURE — 85025 COMPLETE CBC W/AUTO DIFF WBC: CPT | Performed by: INTERNAL MEDICINE

## 2024-04-19 PROCEDURE — C1760 CLOSURE DEV, VASC: HCPCS | Performed by: INTERNAL MEDICINE

## 2024-04-19 PROCEDURE — B2111ZZ FLUOROSCOPY OF MULTIPLE CORONARY ARTERIES USING LOW OSMOLAR CONTRAST: ICD-10-PCS | Performed by: INTERNAL MEDICINE

## 2024-04-19 PROCEDURE — 272N000001 HC OR GENERAL SUPPLY STERILE: Performed by: INTERNAL MEDICINE

## 2024-04-19 PROCEDURE — 250N000013 HC RX MED GY IP 250 OP 250 PS 637: Performed by: NURSE PRACTITIONER

## 2024-04-19 PROCEDURE — 93970 EXTREMITY STUDY: CPT

## 2024-04-19 PROCEDURE — 85347 COAGULATION TIME ACTIVATED: CPT

## 2024-04-19 PROCEDURE — 250N000011 HC RX IP 250 OP 636: Performed by: INTERNAL MEDICINE

## 2024-04-19 PROCEDURE — 93306 TTE W/DOPPLER COMPLETE: CPT

## 2024-04-19 PROCEDURE — 250N000013 HC RX MED GY IP 250 OP 250 PS 637: Performed by: INTERNAL MEDICINE

## 2024-04-19 PROCEDURE — 250N000009 HC RX 250: Performed by: INTERNAL MEDICINE

## 2024-04-19 PROCEDURE — 86900 BLOOD TYPING SEROLOGIC ABO: CPT | Performed by: INTERNAL MEDICINE

## 2024-04-19 PROCEDURE — 99222 1ST HOSP IP/OBS MODERATE 55: CPT | Mod: 25 | Performed by: STUDENT IN AN ORGANIZED HEALTH CARE EDUCATION/TRAINING PROGRAM

## 2024-04-19 PROCEDURE — C1887 CATHETER, GUIDING: HCPCS | Performed by: INTERNAL MEDICINE

## 2024-04-19 PROCEDURE — 99152 MOD SED SAME PHYS/QHP 5/>YRS: CPT | Performed by: INTERNAL MEDICINE

## 2024-04-19 PROCEDURE — C1769 GUIDE WIRE: HCPCS | Performed by: INTERNAL MEDICINE

## 2024-04-19 PROCEDURE — 99223 1ST HOSP IP/OBS HIGH 75: CPT | Performed by: INTERNAL MEDICINE

## 2024-04-19 PROCEDURE — 93306 TTE W/DOPPLER COMPLETE: CPT | Mod: 26 | Performed by: INTERNAL MEDICINE

## 2024-04-19 PROCEDURE — 255N000002 HC RX 255 OP 636: Performed by: INTERNAL MEDICINE

## 2024-04-19 PROCEDURE — 210N000001 HC R&B IMCU HEART CARE

## 2024-04-19 PROCEDURE — 80061 LIPID PANEL: CPT | Performed by: INTERNAL MEDICINE

## 2024-04-19 PROCEDURE — 99153 MOD SED SAME PHYS/QHP EA: CPT | Performed by: INTERNAL MEDICINE

## 2024-04-19 PROCEDURE — C1894 INTRO/SHEATH, NON-LASER: HCPCS | Performed by: INTERNAL MEDICINE

## 2024-04-19 PROCEDURE — 85520 HEPARIN ASSAY: CPT | Performed by: PHYSICIAN ASSISTANT

## 2024-04-19 PROCEDURE — 84484 ASSAY OF TROPONIN QUANT: CPT | Performed by: INTERNAL MEDICINE

## 2024-04-19 DEVICE — CLOSURE ANGIOSEAL 6FR 610130: Type: IMPLANTABLE DEVICE | Status: FUNCTIONAL

## 2024-04-19 RX ORDER — NALOXONE HYDROCHLORIDE 0.4 MG/ML
0.4 INJECTION, SOLUTION INTRAMUSCULAR; INTRAVENOUS; SUBCUTANEOUS
Status: ACTIVE | OUTPATIENT
Start: 2024-04-19 | End: 2024-04-19

## 2024-04-19 RX ORDER — LANOLIN ALCOHOL/MO/W.PET/CERES
1000 CREAM (GRAM) TOPICAL DAILY
Status: DISCONTINUED | OUTPATIENT
Start: 2024-04-20 | End: 2024-04-20 | Stop reason: HOSPADM

## 2024-04-19 RX ORDER — ASPIRIN 81 MG/1
162 TABLET, CHEWABLE ORAL DAILY
Status: CANCELLED | OUTPATIENT
Start: 2024-04-20

## 2024-04-19 RX ORDER — AMOXICILLIN 250 MG
2 CAPSULE ORAL 2 TIMES DAILY PRN
Status: DISCONTINUED | OUTPATIENT
Start: 2024-04-19 | End: 2024-04-20 | Stop reason: HOSPADM

## 2024-04-19 RX ORDER — MAGNESIUM OXIDE 400 MG/1
400 TABLET ORAL EVERY 4 HOURS
Status: DISCONTINUED | OUTPATIENT
Start: 2024-04-19 | End: 2024-04-19 | Stop reason: HOSPADM

## 2024-04-19 RX ORDER — DIAZEPAM 5 MG
5 TABLET ORAL ONCE
Status: COMPLETED | OUTPATIENT
Start: 2024-04-19 | End: 2024-04-19

## 2024-04-19 RX ORDER — SODIUM CHLORIDE 9 MG/ML
INJECTION, SOLUTION INTRAVENOUS CONTINUOUS
Status: CANCELLED | OUTPATIENT
Start: 2024-04-19

## 2024-04-19 RX ORDER — ACETAMINOPHEN 325 MG/1
650 TABLET ORAL EVERY 4 HOURS PRN
Status: DISCONTINUED | OUTPATIENT
Start: 2024-04-19 | End: 2024-04-20 | Stop reason: HOSPADM

## 2024-04-19 RX ORDER — OXYCODONE HYDROCHLORIDE 5 MG/1
10 TABLET ORAL EVERY 4 HOURS PRN
Status: DISCONTINUED | OUTPATIENT
Start: 2024-04-19 | End: 2024-04-20 | Stop reason: HOSPADM

## 2024-04-19 RX ORDER — LANOLIN ALCOHOL/MO/W.PET/CERES
1000 CREAM (GRAM) TOPICAL DAILY
Status: CANCELLED | OUTPATIENT
Start: 2024-04-19

## 2024-04-19 RX ORDER — NITROGLYCERIN 0.4 MG/1
0.4 TABLET SUBLINGUAL EVERY 5 MIN PRN
Status: DISCONTINUED | OUTPATIENT
Start: 2024-04-19 | End: 2024-04-20 | Stop reason: HOSPADM

## 2024-04-19 RX ORDER — AMOXICILLIN 250 MG
1 CAPSULE ORAL 2 TIMES DAILY PRN
Status: DISCONTINUED | OUTPATIENT
Start: 2024-04-19 | End: 2024-04-20 | Stop reason: HOSPADM

## 2024-04-19 RX ORDER — NITROGLYCERIN 0.4 MG/1
0.4 TABLET SUBLINGUAL EVERY 5 MIN PRN
Status: CANCELLED | OUTPATIENT
Start: 2024-04-19

## 2024-04-19 RX ORDER — SODIUM CHLORIDE 9 MG/ML
75 INJECTION, SOLUTION INTRAVENOUS CONTINUOUS
Status: ACTIVE | OUTPATIENT
Start: 2024-04-19 | End: 2024-04-19

## 2024-04-19 RX ORDER — AMLODIPINE BESYLATE 5 MG/1
5 TABLET ORAL DAILY
Status: CANCELLED | OUTPATIENT
Start: 2024-04-19

## 2024-04-19 RX ORDER — ASPIRIN 325 MG
325 TABLET ORAL ONCE
Status: COMPLETED | OUTPATIENT
Start: 2024-04-19 | End: 2024-04-19

## 2024-04-19 RX ORDER — AMLODIPINE BESYLATE 5 MG/1
5 TABLET ORAL DAILY
Status: DISCONTINUED | OUTPATIENT
Start: 2024-04-20 | End: 2024-04-20 | Stop reason: HOSPADM

## 2024-04-19 RX ORDER — ACETAMINOPHEN 650 MG/1
650 SUPPOSITORY RECTAL EVERY 4 HOURS PRN
Status: DISCONTINUED | OUTPATIENT
Start: 2024-04-19 | End: 2024-04-20 | Stop reason: HOSPADM

## 2024-04-19 RX ORDER — LISINOPRIL AND HYDROCHLOROTHIAZIDE 12.5; 2 MG/1; MG/1
2 TABLET ORAL EVERY EVENING
Status: DISCONTINUED | OUTPATIENT
Start: 2024-04-19 | End: 2024-04-20 | Stop reason: HOSPADM

## 2024-04-19 RX ORDER — LISINOPRIL AND HYDROCHLOROTHIAZIDE 12.5; 2 MG/1; MG/1
2 TABLET ORAL DAILY
Status: CANCELLED | OUTPATIENT
Start: 2024-04-19

## 2024-04-19 RX ORDER — ASPIRIN 81 MG/1
162 TABLET, CHEWABLE ORAL DAILY
Status: DISCONTINUED | OUTPATIENT
Start: 2024-04-20 | End: 2024-04-20 | Stop reason: HOSPADM

## 2024-04-19 RX ORDER — POTASSIUM CHLORIDE 1500 MG/1
20 TABLET, EXTENDED RELEASE ORAL ONCE
Status: COMPLETED | OUTPATIENT
Start: 2024-04-19 | End: 2024-04-19

## 2024-04-19 RX ORDER — SODIUM CHLORIDE 9 MG/ML
INJECTION, SOLUTION INTRAVENOUS CONTINUOUS
Status: DISCONTINUED | OUTPATIENT
Start: 2024-04-19 | End: 2024-04-19 | Stop reason: HOSPADM

## 2024-04-19 RX ORDER — FENTANYL CITRATE 50 UG/ML
INJECTION, SOLUTION INTRAMUSCULAR; INTRAVENOUS
Status: DISCONTINUED | OUTPATIENT
Start: 2024-04-19 | End: 2024-04-19 | Stop reason: HOSPADM

## 2024-04-19 RX ORDER — LIDOCAINE 40 MG/G
CREAM TOPICAL
Status: DISCONTINUED | OUTPATIENT
Start: 2024-04-19 | End: 2024-04-19 | Stop reason: HOSPADM

## 2024-04-19 RX ORDER — ROSUVASTATIN CALCIUM 10 MG/1
20 TABLET, COATED ORAL DAILY
Status: DISCONTINUED | OUTPATIENT
Start: 2024-04-20 | End: 2024-04-20 | Stop reason: HOSPADM

## 2024-04-19 RX ORDER — NALOXONE HYDROCHLORIDE 0.4 MG/ML
0.2 INJECTION, SOLUTION INTRAMUSCULAR; INTRAVENOUS; SUBCUTANEOUS
Status: ACTIVE | OUTPATIENT
Start: 2024-04-19 | End: 2024-04-19

## 2024-04-19 RX ORDER — ROSUVASTATIN CALCIUM 10 MG/1
20 TABLET, COATED ORAL DAILY
Status: CANCELLED | OUTPATIENT
Start: 2024-04-20

## 2024-04-19 RX ORDER — ACETAMINOPHEN 650 MG/1
650 SUPPOSITORY RECTAL EVERY 4 HOURS PRN
Status: CANCELLED | OUTPATIENT
Start: 2024-04-19

## 2024-04-19 RX ORDER — FENTANYL CITRATE 50 UG/ML
25 INJECTION, SOLUTION INTRAMUSCULAR; INTRAVENOUS
Status: DISCONTINUED | OUTPATIENT
Start: 2024-04-19 | End: 2024-04-19

## 2024-04-19 RX ORDER — SODIUM CHLORIDE 9 MG/ML
INJECTION, SOLUTION INTRAVENOUS CONTINUOUS PRN
Status: COMPLETED | OUTPATIENT
Start: 2024-04-19 | End: 2024-04-19

## 2024-04-19 RX ORDER — SODIUM CHLORIDE, SODIUM LACTATE, POTASSIUM CHLORIDE, CALCIUM CHLORIDE 600; 310; 30; 20 MG/100ML; MG/100ML; MG/100ML; MG/100ML
INJECTION, SOLUTION INTRAVENOUS CONTINUOUS
Status: DISCONTINUED | OUTPATIENT
Start: 2024-04-19 | End: 2024-04-19

## 2024-04-19 RX ORDER — AMOXICILLIN 250 MG
2 CAPSULE ORAL 2 TIMES DAILY PRN
Status: CANCELLED | OUTPATIENT
Start: 2024-04-19

## 2024-04-19 RX ORDER — OXYCODONE HYDROCHLORIDE 5 MG/1
5 TABLET ORAL EVERY 4 HOURS PRN
Status: DISCONTINUED | OUTPATIENT
Start: 2024-04-19 | End: 2024-04-20 | Stop reason: HOSPADM

## 2024-04-19 RX ORDER — ASPIRIN 325 MG
325 TABLET ORAL ONCE
Status: CANCELLED | OUTPATIENT
Start: 2024-04-19 | End: 2024-04-19

## 2024-04-19 RX ORDER — AMOXICILLIN 250 MG
1 CAPSULE ORAL 2 TIMES DAILY PRN
Status: CANCELLED | OUTPATIENT
Start: 2024-04-19

## 2024-04-19 RX ORDER — MAGNESIUM OXIDE 400 MG/1
400 TABLET ORAL EVERY 4 HOURS
Qty: 1 TABLET | Refills: 0 | Status: COMPLETED | OUTPATIENT
Start: 2024-04-19 | End: 2024-04-19

## 2024-04-19 RX ORDER — ROSUVASTATIN CALCIUM 10 MG/1
20 TABLET, COATED ORAL DAILY
Status: DISCONTINUED | OUTPATIENT
Start: 2024-04-19 | End: 2024-04-19 | Stop reason: HOSPADM

## 2024-04-19 RX ORDER — CALCIUM CARBONATE 500 MG/1
1000 TABLET, CHEWABLE ORAL 4 TIMES DAILY PRN
Status: CANCELLED | OUTPATIENT
Start: 2024-04-19

## 2024-04-19 RX ORDER — CALCIUM CARBONATE 500 MG/1
1000 TABLET, CHEWABLE ORAL 4 TIMES DAILY PRN
Status: DISCONTINUED | OUTPATIENT
Start: 2024-04-19 | End: 2024-04-20 | Stop reason: HOSPADM

## 2024-04-19 RX ORDER — IODIXANOL 320 MG/ML
INJECTION, SOLUTION INTRAVASCULAR
Status: DISCONTINUED | OUTPATIENT
Start: 2024-04-19 | End: 2024-04-19 | Stop reason: HOSPADM

## 2024-04-19 RX ORDER — FLUMAZENIL 0.1 MG/ML
0.2 INJECTION, SOLUTION INTRAVENOUS
Status: ACTIVE | OUTPATIENT
Start: 2024-04-19 | End: 2024-04-19

## 2024-04-19 RX ORDER — LIDOCAINE 40 MG/G
CREAM TOPICAL
Status: CANCELLED | OUTPATIENT
Start: 2024-04-19

## 2024-04-19 RX ORDER — LIDOCAINE 40 MG/G
CREAM TOPICAL
Status: DISCONTINUED | OUTPATIENT
Start: 2024-04-19 | End: 2024-04-20 | Stop reason: HOSPADM

## 2024-04-19 RX ORDER — ACETAMINOPHEN 325 MG/1
650 TABLET ORAL EVERY 4 HOURS PRN
Status: CANCELLED | OUTPATIENT
Start: 2024-04-19

## 2024-04-19 RX ORDER — SODIUM CHLORIDE, SODIUM LACTATE, POTASSIUM CHLORIDE, CALCIUM CHLORIDE 600; 310; 30; 20 MG/100ML; MG/100ML; MG/100ML; MG/100ML
INJECTION, SOLUTION INTRAVENOUS CONTINUOUS
Status: CANCELLED | OUTPATIENT
Start: 2024-04-19

## 2024-04-19 RX ORDER — HEPARIN SODIUM 10000 [USP'U]/100ML
0-5000 INJECTION, SOLUTION INTRAVENOUS CONTINUOUS
Status: CANCELLED | OUTPATIENT
Start: 2024-04-19

## 2024-04-19 RX ORDER — HYDRALAZINE HYDROCHLORIDE 20 MG/ML
10 INJECTION INTRAMUSCULAR; INTRAVENOUS
Status: DISCONTINUED | OUTPATIENT
Start: 2024-04-19 | End: 2024-04-20 | Stop reason: HOSPADM

## 2024-04-19 RX ORDER — ATROPINE SULFATE 0.1 MG/ML
0.5 INJECTION INTRAVENOUS
Status: ACTIVE | OUTPATIENT
Start: 2024-04-19 | End: 2024-04-19

## 2024-04-19 RX ORDER — ASPIRIN 81 MG/1
243 TABLET, CHEWABLE ORAL ONCE
Status: COMPLETED | OUTPATIENT
Start: 2024-04-19 | End: 2024-04-19

## 2024-04-19 RX ORDER — ASPIRIN 81 MG/1
243 TABLET, CHEWABLE ORAL ONCE
Status: CANCELLED | OUTPATIENT
Start: 2024-04-19

## 2024-04-19 RX ORDER — HEPARIN SODIUM 10000 [USP'U]/100ML
0-5000 INJECTION, SOLUTION INTRAVENOUS CONTINUOUS
Status: DISCONTINUED | OUTPATIENT
Start: 2024-04-19 | End: 2024-04-19

## 2024-04-19 RX ORDER — MAGNESIUM OXIDE 400 MG/1
400 TABLET ORAL EVERY 4 HOURS
Status: CANCELLED | OUTPATIENT
Start: 2024-04-19 | End: 2024-04-19

## 2024-04-19 RX ADMIN — CYANOCOBALAMIN TAB 1000 MCG 1000 MCG: 1000 TAB at 09:36

## 2024-04-19 RX ADMIN — DIAZEPAM 5 MG: 5 TABLET ORAL at 13:31

## 2024-04-19 RX ADMIN — ROSUVASTATIN CALCIUM 20 MG: 10 TABLET, FILM COATED ORAL at 09:36

## 2024-04-19 RX ADMIN — LISINOPRIL AND HYDROCHLOROTHIAZIDE 2 TABLET: 12.5; 2 TABLET ORAL at 20:36

## 2024-04-19 RX ADMIN — AMLODIPINE BESYLATE 5 MG: 5 TABLET ORAL at 09:37

## 2024-04-19 RX ADMIN — MAGNESIUM OXIDE TAB 400 MG (241.3 MG ELEMENTAL MG) 400 MG: 400 (241.3 MG) TAB at 18:43

## 2024-04-19 RX ADMIN — ASPIRIN 81 MG CHEWABLE TABLET 162 MG: 81 TABLET CHEWABLE at 09:36

## 2024-04-19 RX ADMIN — SODIUM CHLORIDE 150 ML/HR: 9 INJECTION, SOLUTION INTRAVENOUS at 13:18

## 2024-04-19 RX ADMIN — POTASSIUM CHLORIDE 20 MEQ: 1500 TABLET, EXTENDED RELEASE ORAL at 09:37

## 2024-04-19 RX ADMIN — Medication 400 MG: at 09:37

## 2024-04-19 RX ADMIN — METOPROLOL TARTRATE 12.5 MG: 25 TABLET, FILM COATED ORAL at 20:36

## 2024-04-19 ASSESSMENT — ACTIVITIES OF DAILY LIVING (ADL)
ADLS_ACUITY_SCORE: 36
ADLS_ACUITY_SCORE: 22
ADLS_ACUITY_SCORE: 21
ADLS_ACUITY_SCORE: 22
ADLS_ACUITY_SCORE: 36
ADLS_ACUITY_SCORE: 21
ADLS_ACUITY_SCORE: 36
ADLS_ACUITY_SCORE: 21
ADLS_ACUITY_SCORE: 22
ADLS_ACUITY_SCORE: 22
ADLS_ACUITY_SCORE: 36
ADLS_ACUITY_SCORE: 21
ADLS_ACUITY_SCORE: 36
ADLS_ACUITY_SCORE: 21
ADLS_ACUITY_SCORE: 21
ADLS_ACUITY_SCORE: 22

## 2024-04-19 ASSESSMENT — EJECTION FRACTION: EF_VALUE: .32

## 2024-04-19 NOTE — INTERVAL H&P NOTE
"I have reviewed the surgical (or preoperative) H&P that is linked to this encounter, and examined the patient. There are no significant changes    Clinical Conditions Present on Arrival:  Clinically Significant Risk Factors Present on Admission        # Hypokalemia: Lowest K = 3.2 mmol/L in last 2 days, will replace as needed   # Hypercalcemia: Highest Ca = 10.4 mg/dL in last 2 days, will monitor as appropriate     # Hypoalbuminemia: Lowest albumin = 3.4 g/dL at 4/19/2024  2:51 AM, will monitor as appropriate     # Obesity: Estimated body mass index is 31.18 kg/m  as calculated from the following:    Height as of 4/18/24: 1.702 m (5' 7\").    Weight as of an earlier encounter on 4/19/24: 90.3 kg (199 lb 1.6 oz).       "

## 2024-04-19 NOTE — CONSULTS
"  HEART CARE CONSULTATON NOTE        Assessment/Recommendations   Assessment:  1.  Non-ST elevation myocardial infarction: Elevated troponins with an episode of chest pain and occurred yesterday without recurrence since that time.  Discussed with patient and recommend proceeding with coronary angiogram.  She understands risks and benefits of the procedure and agrees to proceed.  2.  Hypertension  3.  Hyperlipidemia    Plan:  1.  Transfer to [facility] for coronary angiography with possible intervention today  2.  Echocardiogram pending  3.  Continue on aspirin and heparin drip  4.  Start Crestor 20 mg daily, continue metoprolol  On discharge May follow-up with me as outpatient  Clinically Significant Risk Factors Present on Admission        # Hypokalemia: Lowest K = 3.2 mmol/L in last 2 days, will replace as needed      # Hypercalcemia: Highest Ca = 10.4 mg/dL in last 2 days, will monitor as appropriate    # Hypoalbuminemia: Lowest albumin = 3.4 g/dL at 4/19/2024  2:51 AM, will monitor as appropriate         # Hypertension: Noted on problem list        # Obesity: Estimated body mass index is 31.18 kg/m  as calculated from the following:    Height as of this encounter: 1.702 m (5' 7\").    Weight as of this encounter: 90.3 kg (199 lb 1.6 oz).                History of Present Illness/Subjective    HPI: Angelica Wright is a 73 year old female with history of hyperlipidemia, hypertension who presented to the hospital with complaints of chest pain.  Yesterday around 430pm she was at home getting ready to go out for a birthday party.  She started developing left-sided burning discomfort and heaviness.  With this she felt some nausea and tingling in the left fingers.  It lasted about 15 minutes to 30 minutes and resolved by the time she came to the ED.  Twelve-lead EKG personally reviewed.  Demonstrates normal sinus rhythm at 70 bpm with ST-T abnormalities and LVH.  Troponin .       Physical Examination  Review of " "Systems   VITALS: BP (!) 166/87 (BP Location: Right arm)   Pulse 57   Temp 97.8  F (36.6  C) (Oral)   Resp 18   Ht 1.702 m (5' 7\")   Wt 90.3 kg (199 lb 1.6 oz)   SpO2 99%   BMI 31.18 kg/m    BMI: Body mass index is 31.18 kg/m .  Wt Readings from Last 3 Encounters:   04/19/24 90.3 kg (199 lb 1.6 oz)       Intake/Output Summary (Last 24 hours) at 4/19/2024 1015  Last data filed at 4/19/2024 0901  Gross per 24 hour   Intake 855.15 ml   Output 425 ml   Net 430.15 ml     General Appearance:   no distress, normal body habitus   ENT/Mouth: membranes moist, no oral lesions or bleeding gums.      EYES:  no scleral icterus, normal conjunctivae   Neck: no carotid bruits or thyromegaly   Chest/Lungs:   lungs are clear to auscultation   Cardiovascular:   Regular. Normal first and second heart sounds with no murmur no edema bilaterally    Abdomen:  bowel sounds are present   Extremities: no cyanosis or clubbing   Skin: no xanthelasma, warm.    Neurologic: normal  bilateral, no tremors     Psychiatric: alert and oriented x3, calm     Review Of Systems  Skin: negative  Eyes: negative  Ears/Nose/Throat: negative  Respiratory: No shortness of breath, dyspnea on exertion, cough, or hemoptysis  Cardiovascular: negative  Gastrointestinal: negative  Genitourinary: negative  Musculoskeletal: negative  Neurologic: negative  Psychiatric: negative  Hematologic/Lymphatic/Immunologic: negative  Endocrine: negative          Lab Results    Chemistry/lipid CBC Cardiac Enzymes/BNP/TSH/INR   Recent Labs   Lab Test 04/19/24  0251   CHOL 230*   HDL 60   *   TRIG 43     Recent Labs   Lab Test 04/19/24  0251   *     Recent Labs   Lab Test 04/19/24  0251      POTASSIUM 3.7   CHLORIDE 110*   CO2 23   *   BUN 17.8   CR 0.65   GFRESTIMATED >90   MELINDA 9.1     Recent Labs   Lab Test 04/19/24  0251 04/18/24  1804   CR 0.65 0.88     No results for input(s): \"A1C\" in the last 12334 hours.       Recent Labs   Lab Test " "04/19/24 0251   WBC 7.5   HGB 12.5   HCT 36.8   MCV 95        Recent Labs   Lab Test 04/19/24 0251 04/18/24  1804   HGB 12.5 13.3    No results for input(s): \"TROPONINI\" in the last 01884 hours.  Recent Labs   Lab Test 04/18/24 2029   NTBNPI 78     Recent Labs   Lab Test 04/18/24 1804   TSH 0.98     Recent Labs   Lab Test 04/18/24 1804   INR 0.90        Medical History  Surgical History Family History Social History   Hypertension  Hyperlipidemia History reviewed. No pertinent surgical history.  No family history of heart disease     Social History     Socioeconomic History    Marital status:      Spouse name: Not on file    Number of children: Not on file    Years of education: Not on file    Highest education level: Not on file   Occupational History    Not on file   Tobacco Use    Smoking status: Not on file    Smokeless tobacco: Not on file   Substance and Sexual Activity    Alcohol use: Not on file    Drug use: Not on file    Sexual activity: Not on file   Other Topics Concern    Not on file   Social History Narrative    Not on file     Social Determinants of Health     Financial Resource Strain: Low Risk  (1/29/2024)    [facility]    Financial Resource Strain     Difficulty of Paying Living Expenses: 3     Difficulty of Paying Living Expenses: Not on file   Food Insecurity: No Food Insecurity (1/29/2024)    [facility]    Food Insecurity     Worried About Running Out of Food in the Last Year: 1   Transportation Needs: No Transportation Needs (1/29/2024)    [facility]    Transportation Needs     Lack of Transportation (Medical): 1   Physical Activity: Not on file   Stress: Not on file   Social Connections: Socially Integrated (1/29/2024)    [facility]    Social Connections     Frequency of Communication " with Friends and Family: 0   Interpersonal Safety: Not on file   Housing Stability: Low Risk  (1/29/2024)    [facility]    Housing Stability     Unable to Pay for Housing in the Last Year: 1         Medications  Allergies   No current outpatient medications on file.      No Known Allergies      Sophia Reyes MD

## 2024-04-19 NOTE — PROGRESS NOTES
Patient arrived to room 326 at 1515. Patient denied pain and chest pain. Radial and femoral sites assessed with cath lab RN. No bleeding or hematoma noted to either sites. Vital signs stable. Call light within reach. Bed alarm in place.

## 2024-04-19 NOTE — H&P
"[facility] MEDICINE ADMISSION HISTORY AND PHYSICAL       Assessment & Plan      Present on Admission (POA)    1. NSTEMI - currently, CP free    - IVF, NPO, anti emetics pain meds  - Cardiology ref - was done in ED, looks like it was also started on BB  - ECHO in AM  - CBC/CMP/lipid profile check     2. Hypokalemia  - protocols, add Mag       Chronic Medical Conditions    1. HTN and HL - PTA meds       VTE prophylaxis --  SCDs, if appropriate, add SQH  Diet --  NPO  Code Status -- Full   Barriers to discharge -- Admitting/Acute medical condition/s  Discharge Disposition and goals --  Unable to determine at this point, pending progress/treatment response.     PPE - I was wearing PPE including but not limited to - N95 mask, Gloves, and/or Safety glasses.  Admission Status --  Inpatient     Care plan is based on available information and patient's condition at encounter. Care plan was discussed and agreed to proceed by the patient/family member. Made aware that care plan may change.     I recommend to review/revise history/care plan for information/results not available during my encounter. All or some home medication/s were not resumed or was modified on admission due to safety concerns. Will have rounding AM doc  review safety to resume held/modified home medications.     Availability -- If need to coordinate care after night shift  -- Contact assigned AM rounding Hospitalist.     75 minutes spent by me on the date of service doing chart review, history, exam, diagnostic test results interpretation, care coordination with RN, RT and/or Pharm, & further activities per the note.      Gary Patterson MD, MPH, FACP, [facility]  Internal Medicine - Hospitalist        Chief Complaint Chest pain      HISTORY     - Patient was seen in ED - 11  - She was at home when she developed with left sided chest pain. She called it \"burning pain\". Soma nausea and tingling on left hand. No SOB.No cough or colds. No " diarrhea. She mentioned some leg swelling. No pain.     - ED course/treatments/referral - EKG - no gross ischemic changes, rising trop, heparin infusion, ASA      - ROS --- No headache. No dizziness. No weakness. No CP or SOB. No palpitations. No abdominal pain. No nausea or vomiting. No urinary symptoms. No bleeding symptoms. No weight loss. Rest of 12 point ROS was reviewed and negative.       Past Medical History     History reviewed. No pertinent past medical history.      Surgical History     History reviewed. No pertinent surgical history.     Family History      History reviewed. No pertinent family history.      Social History      .  Social History     Socioeconomic History    Marital status:      Spouse name: Not on file    Number of children: Not on file    Years of education: Not on file    Highest education level: Not on file   Occupational History    Not on file   Tobacco Use    Smoking status: Not on file    Smokeless tobacco: Not on file   Substance and Sexual Activity    Alcohol use: Not on file    Drug use: Not on file    Sexual activity: Not on file   Other Topics Concern    Not on file   Social History Narrative    Not on file     Social Determinants of Health     Financial Resource Strain: Low Risk  (1/29/2024)    [facility]    Financial Resource Strain     Difficulty of Paying Living Expenses: 3     Difficulty of Paying Living Expenses: Not on file   Food Insecurity: No Food Insecurity (1/29/2024)    Received from [facility]    Food Insecurity     Worried About Running Out of Food in the Last Year: 1   Transportation Needs: No Transportation Needs (1/29/2024)    [facility]    Transportation Needs     Lack of Transportation (Medical): 1   Physical Activity: Not on file   Stress: Not on file   Social Connections: Socially Integrated (1/29/2024)    Received from  "[facility]    Social Connections     Frequency of Communication with Friends and Family: 0   Interpersonal Safety: Not on file   Housing Stability: Low Risk  (1/29/2024)    Received from [facility]    Housing Stability     Unable to Pay for Housing in the Last Year: 1          Allergies      No Known Allergies      Prior to Admission Medications      Current Facility-Administered Medications   Medication Dose Route Frequency Provider Last Rate Last Admin    heparin 25,000 units in 0.45% NaCl 250 mL ANTICOAGULANT infusion  0-5,000 Units/hr Intravenous Continuous Kendy Ontiveros PA-C 11 mL/hr at 04/18/24 2108 1,100 Units/hr at 04/18/24 2108    metoprolol tartrate (LOPRESSOR) tablet 25 mg  25 mg Oral Once Kendy Ontiveros PA-C        nitroGLYcerin (NITROSTAT) sublingual tablet 0.4 mg  0.4 mg Sublingual Q5 Min PRN Kendy Ontiveros PA-C         Current Outpatient Medications   Medication Sig Dispense Refill    amLODIPine (NORVASC) 5 MG tablet Take 1 tablet by mouth daily In the evening      cyanocobalamin (VITAMIN B-12) 1000 MCG tablet Take by mouth once daily.      lisinopril-hydrochlorothiazide (ZESTORETIC) 20-12.5 MG tablet Take 2 tablets by mouth daily In the evening      meloxicam (MOBIC) 7.5 MG tablet Take 7.5 mg by mouth daily as needed for pain             Review of Systems     A 12 point comprehensive review of systems was negative except as noted above in HPI.    PHYSICAL EXAMINATION       Vitals      Vitals: BP (!) 162/77   Pulse 66   Temp 98  F (36.7  C) (Oral)   Resp 23   Ht 1.702 m (5' 7\")   Wt 91.6 kg (202 lb)   SpO2 97%   BMI 31.64 kg/m    BMI= Body mass index is 31.64 kg/m .      Examination     General Appearance:  Alert, cooperative, no distress  Head:    Normocephalic, without obvious abnormality, atraumatic  EENT:  PERRL, conjunctiva/corneas clear, EOM's intact.   Neck:   Supple, symmetrical, " trachea midline, no adenopathy; no NVE  Back:  Symmetric, no curvature, no CVA tenderness  Chest/Lungs: Clear to auscultation bilaterally, respirations unlabored, No tenderness or deformity. No abdominal breathing or use of accessory muscles.   Heart:    Regular rate and rhythm, S1 and S2 normal, no murmur, rub   or gallop  Abdomen: Soft, non-tender, bowel sounds active all four quadrants, not peritoneal on palpation. Not distended  Extremities:  reported mild leg swelling   Skin:  Skin color, texture, turgor normal, no rashes or lesion  Neurologic:  Awake and alert, No lateralizing or localizing signs          Pertinent Lab     Results for orders placed or performed during the hospital encounter of 04/18/24   Chest XR,  PA & LAT    Impression    IMPRESSION: Negative chest.   CBC (+ platelets, no diff)   Result Value Ref Range    WBC Count 5.7 4.0 - 11.0 10e3/uL    RBC Count 4.15 3.80 - 5.20 10e6/uL    Hemoglobin 13.3 11.7 - 15.7 g/dL    Hematocrit 39.4 35.0 - 47.0 %    MCV 95 78 - 100 fL    MCH 32.0 26.5 - 33.0 pg    MCHC 33.8 31.5 - 36.5 g/dL    RDW 13.8 10.0 - 15.0 %    Platelet Count 229 150 - 450 10e3/uL   Basic metabolic panel   Result Value Ref Range    Sodium 142 135 - 145 mmol/L    Potassium 3.2 (L) 3.4 - 5.3 mmol/L    Chloride 103 98 - 107 mmol/L    Carbon Dioxide (CO2) 26 22 - 29 mmol/L    Anion Gap 13 7 - 15 mmol/L    Urea Nitrogen 21.5 8.0 - 23.0 mg/dL    Creatinine 0.88 0.51 - 0.95 mg/dL    GFR Estimate 70 >60 mL/min/1.73m2    Calcium 10.4 (H) 8.8 - 10.2 mg/dL    Glucose 114 (H) 70 - 99 mg/dL   Result Value Ref Range    INR 0.90 0.85 - 1.15   Result Value Ref Range    aPTT 28 22 - 38 Seconds   Troponin T, High Sensitivity (now)   Result Value Ref Range    Troponin T, High Sensitivity 14 <=14 ng/L   Symptomatic Influenza A/B, RSV, & SARS-CoV2 PCR (COVID-19) Nasopharyngeal    Specimen: Nasopharyngeal; Swab   Result Value Ref Range    Influenza A PCR Negative Negative    Influenza B PCR Negative Negative     RSV PCR Negative Negative    SARS CoV2 PCR Negative Negative   D dimer quantitative   Result Value Ref Range    D-Dimer Quantitative 0.61 (H) 0.00 - 0.50 ug/mL FEU   Hepatic function panel   Result Value Ref Range    Protein Total 7.5 6.4 - 8.3 g/dL    Albumin 4.1 3.5 - 5.2 g/dL    Bilirubin Total 0.3 <=1.2 mg/dL    Alkaline Phosphatase 53 40 - 150 U/L    AST 21 0 - 45 U/L    ALT 8 0 - 50 U/L    Bilirubin Direct <0.20 0.00 - 0.30 mg/dL   Result Value Ref Range    Magnesium 2.0 1.7 - 2.3 mg/dL   TSH with free T4 reflex   Result Value Ref Range    TSH 0.98 0.30 - 4.20 uIU/mL   Result Value Ref Range    Lipase 28 13 - 60 U/L   Result Value Ref Range    Troponin T, High Sensitivity 64 (H) <=14 ng/L   ECG 12-LEAD WITH MUSE (LHE)   Result Value Ref Range    Systolic Blood Pressure 182 mmHg    Diastolic Blood Pressure 86 mmHg    Ventricular Rate 70 BPM    Atrial Rate 70 BPM    LA Interval 180 ms    QRS Duration 90 ms     ms    QTc 460 ms    P Axis 73 degrees    R AXIS -8 degrees    T Axis -8 degrees    Interpretation ECG       Sinus rhythm  Voltage criteria for left ventricular hypertrophy  Nonspecific ST and T wave abnormality  Abnormal ECG  No previous ECGs available  Confirmed by SEE ED PROVIDER NOTE FOR, ECG INTERPRETATION ([id number]),  VIOLET SAMUELS ([id number]) on 4/18/2024 6:11:48 PM             Pertinent Radiology

## 2024-04-19 NOTE — H&P (VIEW-ONLY)
"  HEART CARE CONSULTATON NOTE        Assessment/Recommendations   Assessment:  1.  Non-ST elevation myocardial infarction: Elevated troponins with an episode of chest pain and occurred yesterday without recurrence since that time.  Discussed with patient and recommend proceeding with coronary angiogram.  She understands risks and benefits of the procedure and agrees to proceed.  2.  Hypertension  3.  Hyperlipidemia    Plan:  1.  Transfer to [facility] for coronary angiography with possible intervention today  2.  Echocardiogram pending  3.  Continue on aspirin and heparin drip  4.  Start Crestor 20 mg daily, continue metoprolol  On discharge May follow-up with me as outpatient  Clinically Significant Risk Factors Present on Admission        # Hypokalemia: Lowest K = 3.2 mmol/L in last 2 days, will replace as needed      # Hypercalcemia: Highest Ca = 10.4 mg/dL in last 2 days, will monitor as appropriate    # Hypoalbuminemia: Lowest albumin = 3.4 g/dL at 4/19/2024  2:51 AM, will monitor as appropriate         # Hypertension: Noted on problem list        # Obesity: Estimated body mass index is 31.18 kg/m  as calculated from the following:    Height as of this encounter: 1.702 m (5' 7\").    Weight as of this encounter: 90.3 kg (199 lb 1.6 oz).                History of Present Illness/Subjective    HPI: Angelica Wright is a 73 year old female with history of hyperlipidemia, hypertension who presented to the hospital with complaints of chest pain.  Yesterday around 430pm she was at home getting ready to go out for a birthday party.  She started developing left-sided burning discomfort and heaviness.  With this she felt some nausea and tingling in the left fingers.  It lasted about 15 minutes to 30 minutes and resolved by the time she came to the ED.  Twelve-lead EKG personally reviewed.  Demonstrates normal sinus rhythm at 70 bpm with ST-T abnormalities and LVH.  Troponin .       Physical Examination  Review of " "Systems   VITALS: BP (!) 166/87 (BP Location: Right arm)   Pulse 57   Temp 97.8  F (36.6  C) (Oral)   Resp 18   Ht 1.702 m (5' 7\")   Wt 90.3 kg (199 lb 1.6 oz)   SpO2 99%   BMI 31.18 kg/m    BMI: Body mass index is 31.18 kg/m .  Wt Readings from Last 3 Encounters:   04/19/24 90.3 kg (199 lb 1.6 oz)       Intake/Output Summary (Last 24 hours) at 4/19/2024 1015  Last data filed at 4/19/2024 0901  Gross per 24 hour   Intake 855.15 ml   Output 425 ml   Net 430.15 ml     General Appearance:   no distress, normal body habitus   ENT/Mouth: membranes moist, no oral lesions or bleeding gums.      EYES:  no scleral icterus, normal conjunctivae   Neck: no carotid bruits or thyromegaly   Chest/Lungs:   lungs are clear to auscultation   Cardiovascular:   Regular. Normal first and second heart sounds with no murmur no edema bilaterally    Abdomen:  bowel sounds are present   Extremities: no cyanosis or clubbing   Skin: no xanthelasma, warm.    Neurologic: normal  bilateral, no tremors     Psychiatric: alert and oriented x3, calm     Review Of Systems  Skin: negative  Eyes: negative  Ears/Nose/Throat: negative  Respiratory: No shortness of breath, dyspnea on exertion, cough, or hemoptysis  Cardiovascular: negative  Gastrointestinal: negative  Genitourinary: negative  Musculoskeletal: negative  Neurologic: negative  Psychiatric: negative  Hematologic/Lymphatic/Immunologic: negative  Endocrine: negative          Lab Results    Chemistry/lipid CBC Cardiac Enzymes/BNP/TSH/INR   Recent Labs   Lab Test 04/19/24  0251   CHOL 230*   HDL 60   *   TRIG 43     Recent Labs   Lab Test 04/19/24  0251   *     Recent Labs   Lab Test 04/19/24  0251      POTASSIUM 3.7   CHLORIDE 110*   CO2 23   *   BUN 17.8   CR 0.65   GFRESTIMATED >90   MELINDA 9.1     Recent Labs   Lab Test 04/19/24  0251 04/18/24  1804   CR 0.65 0.88     No results for input(s): \"A1C\" in the last 75693 hours.       Recent Labs   Lab Test " "04/19/24 0251   WBC 7.5   HGB 12.5   HCT 36.8   MCV 95        Recent Labs   Lab Test 04/19/24 0251 04/18/24  1804   HGB 12.5 13.3    No results for input(s): \"TROPONINI\" in the last 14790 hours.  Recent Labs   Lab Test 04/18/24 2029   NTBNPI 78     Recent Labs   Lab Test 04/18/24 1804   TSH 0.98     Recent Labs   Lab Test 04/18/24 1804   INR 0.90        Medical History  Surgical History Family History Social History   Hypertension  Hyperlipidemia History reviewed. No pertinent surgical history.  No family history of heart disease     Social History     Socioeconomic History    Marital status:      Spouse name: Not on file    Number of children: Not on file    Years of education: Not on file    Highest education level: Not on file   Occupational History    Not on file   Tobacco Use    Smoking status: Not on file    Smokeless tobacco: Not on file   Substance and Sexual Activity    Alcohol use: Not on file    Drug use: Not on file    Sexual activity: Not on file   Other Topics Concern    Not on file   Social History Narrative    Not on file     Social Determinants of Health     Financial Resource Strain: Low Risk  (1/29/2024)    [facility]    Financial Resource Strain     Difficulty of Paying Living Expenses: 3     Difficulty of Paying Living Expenses: Not on file   Food Insecurity: No Food Insecurity (1/29/2024)    [facility]    Food Insecurity     Worried About Running Out of Food in the Last Year: 1   Transportation Needs: No Transportation Needs (1/29/2024)    [facility]    Transportation Needs     Lack of Transportation (Medical): 1   Physical Activity: Not on file   Stress: Not on file   Social Connections: Socially Integrated (1/29/2024)    [facility]    Social Connections     Frequency of Communication " with Friends and Family: 0   Interpersonal Safety: Not on file   Housing Stability: Low Risk  (1/29/2024)    [facility]    Housing Stability     Unable to Pay for Housing in the Last Year: 1         Medications  Allergies   No current outpatient medications on file.      No Known Allergies      Sophia Reyes MD

## 2024-04-19 NOTE — PROGRESS NOTES
Pt transferring to [facility]. Report given to both EMS and [facility]. All belongings sent with patient. Patient was in stable condition at the time of transfer.

## 2024-04-19 NOTE — PROGRESS NOTES
Brief CV progress note:    Patient underwent coronary angiography which demonstrated mild coronary artery disease with no severe lesions and no PCI performed. OK to discharge from a cardiology standpoint. Disposition per primary service.

## 2024-04-19 NOTE — MEDICATION SCRIBE - ADMISSION MEDICATION HISTORY
Medication Scribe Admission Medication History    Admission medication history is complete. The information provided in this note is only as accurate as the sources available at the time of the update.    Information Source(s): Patient and CareEverywhere/SureScripts via in-person    Pertinent Information: None    Addendum: Pt is taking meloxicam PRN not daily.     Changes made to PTA medication list:  Added: None  Deleted:   Vit D 1,000 international unit(s) - pt no longer taking  Changed: None    Allergies reviewed with patient and updates made in EHR: yes    Medication History Completed By: Valdemar Cabrera 4/18/2024 9:01 PM    PTA Med List   Medication Sig Last Dose    amLODIPine (NORVASC) 5 MG tablet Take 1 tablet by mouth daily In the evening 4/17/2024 at PM    cyanocobalamin (VITAMIN B-12) 1000 MCG tablet Take by mouth once daily. 4/18/2024 at AM    lisinopril-hydrochlorothiazide (ZESTORETIC) 20-12.5 MG tablet Take 2 tablets by mouth daily In the evening 4/17/2024 at PM    meloxicam (MOBIC) 7.5 MG tablet Take 7.5 mg by mouth daily as needed for pain 4/18/2024 at AM

## 2024-04-19 NOTE — PHARMACY-ADMISSION MEDICATION HISTORY
Admission medication history completed at [facility]. Please see Medication Scribe Admission Medication History note from 4/18/2024.

## 2024-04-19 NOTE — PROGRESS NOTES
Patient transferred from ED to room 308. Patient's clothing and cell phone with patient in room. 2 RN skin check done with CHER Ramey.

## 2024-04-19 NOTE — PLAN OF CARE
Problem: Adult Inpatient Plan of Care  Goal: Optimal Comfort and Wellbeing  Outcome: Progressing     Problem: Chest Pain  Goal: Resolution of Chest Pain Symptoms  Outcome: Progressing     Problem: Fall Injury Risk  Goal: Absence of Fall and Fall-Related Injury  Outcome: Progressing     Goal Outcome Evaluation:    Pt is A&O, calls appropriately for needs and is SBA for ambulation. Vitals signs are stable, afebrile, oxygen is on room air, denies pain,TELE monitor reads- NS/SB with slight prolonged QT. Pt is NPO. Pt is voiding spontaneously. IV is running fluids at 75/hr. IV heparin running at 800/hr.  Alarms in place.     Protocols:  Magnesium, recheck in AM  Potassium, recheck in AM     Plan:  Continue to monitor Trop and for chest pain changes, Consult with Cards placed.    Jumana Shi RN  April 19, 2024, 7:00 AM

## 2024-04-19 NOTE — PRE-PROCEDURE
GENERAL PRE-PROCEDURE:   Procedure:  Coronary angiogram with possible PCi  Date/Time:  4/19/2024 12:52 PM    Written consent obtained?: Yes    Risks and benefits: Risks, benefits and alternatives were discussed    Consent given by:  Patient  Patient states understanding of procedure being performed: Yes    Patient's understanding of procedure matches consent: Yes    Procedure consent matches procedure scheduled: Yes    Expected level of sedation:  Moderate  Appropriately NPO:  Yes  ASA Class:  4 (NSTEMI, HTN, hyperlipidemia, Class I obesity; BMI 31.18kg/m2)  Mallampati  :  Grade 2- soft palate, base of uvula, tonsillar pillars, and portion of posterior pharyngeal wall visible  Lungs:  Lungs clear with good breath sounds bilaterally  Heart:  Normal heart sounds and rate  History & Physical reviewed:  History and physical reviewed and updates made (see comment)  H&P Comments:  Clinically Significant Risk Factors Present on Admission    Cardiovascular : NSTEMI, HTN, hyperlipidemia, Class I obesity; BMI 31.18kg/m2    Fluid & Electrolyte Disorders : Not present on admission    Gastroenterology : Not present on admission    Hematology/Oncology : Not present on admission    Nephrology : Not present on admission    Neurology : Not present on admission    Pulmonology : Not present on admission    Systemic : Class I obesity; BMI 31.18kg/m2  Statement of review:  I have reviewed the lab findings, diagnostic data, medications, and the plan for sedation

## 2024-04-19 NOTE — DISCHARGE SUMMARY
"[facility]  Hospitalist Discharge Summary      Date of Admission:  4/18/2024  Date of Discharge:  4/19/2024  Discharging Provider: Jordan Louie MD  Discharge Service: Hospitalist Service    Discharge Diagnoses   NSTEMI  HTN  HLD    Clinically Significant Risk Factors     # Obesity: Estimated body mass index is 31.18 kg/m  as calculated from the following:    Height as of this encounter: 1.702 m (5' 7\").    Weight as of this encounter: 90.3 kg (199 lb 1.6 oz).       Follow-ups Needed After Discharge        Unresulted Labs Ordered in the Past 30 Days of this Admission       Date and Time Order Name Status Description    4/19/2024  4:12 AM Heparin Unfractionated Anti Xa Level In process         These results will be followed up by [facility] team- pt is transferring there now    Discharge Disposition   Transferred to [facility]  Condition at discharge: Stable    Hospital Course     73-year-old female with a past medical history of hypertension hyperlipidemia admitted to the hospital late on 4/18/2024 for NSTEMI, made n.p.o., put on IV fluids, echocardiogram ordered and started on heparin.  Cardiology assessed on 4/19 and plan to transfer to [facility] for coronary angiography with possible intervention.  Continued on aspirin and heparin drip and starting Crestor and metoprolol.  Patient is hemodynamically vitally stable on transfer and is currently pain-free.  Orders for discharge to readmit are completed.  Discussed with bedside team for coordination.    Consultations This Hospital Stay   PHARMACY IP CONSULT  CARDIOLOGY IP CONSULT    Code Status   Full Code    Time Spent on this Encounter   I, Jordan Louie MD, personally saw the patient today and spent greater than 30 minutes discharging this patient.       Jordan Louie MD  [facility] HEART CARE  [address] [facility] [address]  Phone: [phone]  Fax: " [phone]  ______________________________________________________________________    Physical Exam   Vital Signs: Temp: 97.8  F (36.6  C) Temp src: Oral BP: (!) 166/87 Pulse: 57   Resp: 18 SpO2: 99 % O2 Device: None (Room air)    Weight: 199 lbs 1.6 oz    General: alert, oriented, and in no acute distress  Pulmonary: clear to auscultation bilaterally, normal respiratory effort, on room air, no rales or wheezes or evidence of accessory muscle use  CVS: regular rate and rhythm, no murmurs, rubs, or gallops; no blatant jugular venous distention; no extremity edema and extremities are warm to the touch  GI: soft, nontender, BS+, no rebound or guarding, no conspicuous organomegaly   Neuro: nonfocal, moves all extremities of own volition  Psych: appropriate        Primary Care Physician   [facility]    Discharge Orders   No discharge procedures on file.    Significant Results and Procedures   Results for orders placed or performed during the hospital encounter of 04/18/24   Chest XR,  PA & LAT    Narrative    EXAM: XR CHEST 2 VIEWS  LOCATION: [facility]  DATE: 4/18/2024    INDICATION: chest pain  COMPARISON: None.      Impression    IMPRESSION: Negative chest.   US Lower Extremity Venous Duplex Bilateral    Narrative    EXAM: US LOWER EXTREMITY VENOUS DUPLEX BILATERAL  LOCATION: [facility]  DATE: 4/19/2024    INDICATION: Leg swelling, left greater than right.  COMPARISON: None.  TECHNIQUE: Venous Duplex ultrasound of bilateral lower extremities with and without compression, augmentation and duplex. Color flow and spectral Doppler with waveform analysis performed.    FINDINGS: Exam includes the common femoral, femoral, popliteal veins as well as segmentally visualized deep calf veins and greater saphenous vein.     RIGHT: No deep vein thrombosis. No superficial thrombophlebitis.    LEFT: No deep vein thrombosis. No superficial thrombophlebitis.       Impression    IMPRESSION:  1.  No deep venous thrombosis in the bilateral lower extremities.   Echocardiogram Complete    Narrative    [id number]  [id number]  [id number]  [id number]^ALIZA^[place] [address]     Name: MS. SHEA LEE  MRN: [id number]  : 1950  Study Date: 2024 08:44 AM  Age: 73 yrs  Gender: Female  Patient Location: [facility]  Reason For Study: Chest Pain  Ordering Physician: TRISTEN ABRAMS  Performed By: KARINA     BSA: 2.0 m2  Height: 67 in  Weight: 199 lb  HR: 56  BP: 163/77 mmHg  ______________________________________________________________________________  Procedure  Complete Portable Echo Adult.  ______________________________________________________________________________  Interpretation Summary     1. Normal left ventricular size and systolic performance with a visually  estimated ejection fraction of 65%.  2. There is mild, to perhaps mild-moderate, mitral insufficiency.  3. Normal right ventricular size and systolic performance.  4. There is mild left atrial enlargement.  ______________________________________________________________________________  Left ventricle:  Normal left ventricular size and systolic performance with a visually  estimated ejection fraction of 65%. There is normal regional wall motion. Left  ventricular wall thickness is normal.     Assessment of LV Diastolic Function: The cumulative findings suggest normal  diastolic filling [The septal e' velocity is < 7 cm/s & lateral e' velocity  is  < 10 cm/s. The average E/e' is < 14. TR velocity is < 2.8 m/s. Left atrial  volume index is less than 34 mL/mÂ ].     Right ventricle:  Normal right ventricular size and systolic performance.     Left atrium:  There is mild left atrial enlargement.     Right atrium:  The right atrium is of normal size. There is a slightly prominent eustachian  valve remnant.     IVC:  The IVC is mildly dilated.     Aortic valve:  The  aortic valve is comprised of three cusps. No significant aortic stenosis  or aortic insufficiency is detected on this study.     Mitral valve:  The mitral valve appears morphologically normal.There is mild, to perhaps  mild-moderate, mitral insufficiency.     Tricuspid valve:  The tricuspid valve is grossly morphologically normal. There is trace  tricuspid insufficiency.     Pulmonic valve:  The pulmonic valve is grossly morphologically normal.     Thoracic aorta:  The aortic root and proximal ascending aorta are of normal dimension.     Pericardium:  There is no significant pericardial effusion.  ______________________________________________________________________________  ______________________________________________________________________________  MMode/2D Measurements & Calculations  IVSd: 0.91 cm  LVIDd: 3.9 cm  LVIDs: 2.6 cm  LVPWd: 0.79 cm  FS: 34.4 %  LV mass(C)d: 98.3 grams  LV mass(C)dI: 48.7 grams/m2  Ao root diam: 2.9 cm  LA dimension: 4.2 cm  asc Aorta Diam: 3.1 cm  LA/Ao: 1.4  LVOT diam: 2.0 cm  LVOT area: 3.0 cm2  Ao root diam index Ht(cm/m): 1.7  Ao root diam index BSA (cm/m2): 1.5  Asc Ao diam index BSA (cm/m2): 1.5  Asc Ao diam index Ht(cm/m): 1.8  EF Biplane: 68.1 %     LA Volume Indexed (AL/bp): 33.8 ml/m2  RWT: 0.40  TAPSE: 2.4 cm     Time Measurements  MM HR: 60.0 BPM     Doppler Measurements & Calculations  MV E max orlando: 76.4 cm/sec  MV A max orlando: 79.4 cm/sec  MV E/A: 0.96  MV dec time: 0.20 sec  LV V1 max P.6 mmHg  LV V1 max: 107.5 cm/sec  LV V1 VTI: 26.2 cm  SV(LVOT): 79.7 ml  SI(LVOT): 39.5 ml/m2     PA acc time: 0.08 sec  TR max orlando: 241.6 cm/sec  TR max P.4 mmHg  E/E' av.2  Lateral E/e': 11.5  Medial E/e': 11.0  RV S [place]: 12.4 cm/sec     ______________________________________________________________________________  Report approved by: Kim Knapp 2024 10:08 AM             Discharge Medications   Current Discharge Medication List        CONTINUE these  medications which have NOT CHANGED    Details   amLODIPine (NORVASC) 5 MG tablet Take 1 tablet by mouth daily In the evening      cyanocobalamin (VITAMIN B-12) 1000 MCG tablet Take by mouth once daily.      lisinopril-hydrochlorothiazide (ZESTORETIC) 20-12.5 MG tablet Take 2 tablets by mouth daily In the evening      meloxicam (MOBIC) 7.5 MG tablet Take 7.5 mg by mouth daily as needed for pain           Allergies   No Known Allergies

## 2024-04-19 NOTE — Clinical Note
Right arm board applied. [FreeTextEntry1] : continue medications \par further instructions pending lab results \par Advised to lose weight

## 2024-04-19 NOTE — CARE PLAN
Pt admitted to [facility] after a NSTEMI, denies any pain, [facility], on Heparin gtt.

## 2024-04-20 VITALS
WEIGHT: 196.2 LBS | OXYGEN SATURATION: 95 % | HEART RATE: 62 BPM | RESPIRATION RATE: 20 BRPM | SYSTOLIC BLOOD PRESSURE: 147 MMHG | TEMPERATURE: 98.2 F | BODY MASS INDEX: 30.73 KG/M2 | DIASTOLIC BLOOD PRESSURE: 82 MMHG

## 2024-04-20 LAB
ERYTHROCYTE [DISTWIDTH] IN BLOOD BY AUTOMATED COUNT: 13.8 % (ref 10–15)
HCT VFR BLD AUTO: 39.5 % (ref 35–47)
HGB BLD-MCNC: 13.1 G/DL (ref 11.7–15.7)
MAGNESIUM SERPL-MCNC: 2 MG/DL (ref 1.7–2.3)
MCH RBC QN AUTO: 31.8 PG (ref 26.5–33)
MCHC RBC AUTO-ENTMCNC: 33.2 G/DL (ref 31.5–36.5)
MCV RBC AUTO: 96 FL (ref 78–100)
PLATELET # BLD AUTO: 201 10E3/UL (ref 150–450)
POTASSIUM SERPL-SCNC: 3.7 MMOL/L (ref 3.4–5.3)
RBC # BLD AUTO: 4.12 10E6/UL (ref 3.8–5.2)
WBC # BLD AUTO: 6.3 10E3/UL (ref 4–11)

## 2024-04-20 PROCEDURE — 250N000013 HC RX MED GY IP 250 OP 250 PS 637: Performed by: STUDENT IN AN ORGANIZED HEALTH CARE EDUCATION/TRAINING PROGRAM

## 2024-04-20 PROCEDURE — 99233 SBSQ HOSP IP/OBS HIGH 50: CPT | Performed by: INTERNAL MEDICINE

## 2024-04-20 PROCEDURE — 99232 SBSQ HOSP IP/OBS MODERATE 35: CPT | Performed by: INTERNAL MEDICINE

## 2024-04-20 PROCEDURE — 85027 COMPLETE CBC AUTOMATED: CPT | Performed by: STUDENT IN AN ORGANIZED HEALTH CARE EDUCATION/TRAINING PROGRAM

## 2024-04-20 PROCEDURE — 83735 ASSAY OF MAGNESIUM: CPT | Performed by: STUDENT IN AN ORGANIZED HEALTH CARE EDUCATION/TRAINING PROGRAM

## 2024-04-20 PROCEDURE — 250N000013 HC RX MED GY IP 250 OP 250 PS 637: Performed by: INTERNAL MEDICINE

## 2024-04-20 PROCEDURE — 84132 ASSAY OF SERUM POTASSIUM: CPT | Performed by: STUDENT IN AN ORGANIZED HEALTH CARE EDUCATION/TRAINING PROGRAM

## 2024-04-20 PROCEDURE — 36415 COLL VENOUS BLD VENIPUNCTURE: CPT | Performed by: STUDENT IN AN ORGANIZED HEALTH CARE EDUCATION/TRAINING PROGRAM

## 2024-04-20 RX ORDER — ROSUVASTATIN CALCIUM 20 MG/1
20 TABLET, COATED ORAL DAILY
Qty: 30 TABLET | Refills: 0 | Status: SHIPPED | OUTPATIENT
Start: 2024-04-21

## 2024-04-20 RX ORDER — POTASSIUM CHLORIDE 1500 MG/1
20 TABLET, EXTENDED RELEASE ORAL ONCE
Status: COMPLETED | OUTPATIENT
Start: 2024-04-20 | End: 2024-04-20

## 2024-04-20 RX ORDER — NITROGLYCERIN 0.4 MG/1
TABLET SUBLINGUAL
Qty: 30 TABLET | Refills: 0 | Status: SHIPPED | OUTPATIENT
Start: 2024-04-20

## 2024-04-20 RX ORDER — ASPIRIN 81 MG/1
81 TABLET, CHEWABLE ORAL DAILY
COMMUNITY
Start: 2024-04-21

## 2024-04-20 RX ORDER — METOPROLOL TARTRATE 25 MG/1
12.5 TABLET, FILM COATED ORAL 2 TIMES DAILY
Qty: 60 TABLET | Refills: 0 | Status: SHIPPED | OUTPATIENT
Start: 2024-04-20

## 2024-04-20 RX ORDER — MAGNESIUM OXIDE 400 MG/1
400 TABLET ORAL EVERY 4 HOURS
Status: COMPLETED | OUTPATIENT
Start: 2024-04-20 | End: 2024-04-20

## 2024-04-20 RX ADMIN — MAGNESIUM OXIDE TAB 400 MG (241.3 MG ELEMENTAL MG) 400 MG: 400 (241.3 MG) TAB at 08:08

## 2024-04-20 RX ADMIN — METOPROLOL TARTRATE 12.5 MG: 25 TABLET, FILM COATED ORAL at 08:07

## 2024-04-20 RX ADMIN — ACETAMINOPHEN 650 MG: 325 TABLET ORAL at 08:07

## 2024-04-20 RX ADMIN — ASPIRIN 81 MG CHEWABLE TABLET 162 MG: 81 TABLET CHEWABLE at 08:07

## 2024-04-20 RX ADMIN — ROSUVASTATIN CALCIUM 20 MG: 10 TABLET, FILM COATED ORAL at 08:08

## 2024-04-20 RX ADMIN — MAGNESIUM OXIDE TAB 400 MG (241.3 MG ELEMENTAL MG) 400 MG: 400 (241.3 MG) TAB at 11:15

## 2024-04-20 RX ADMIN — AMLODIPINE BESYLATE 5 MG: 5 TABLET ORAL at 08:08

## 2024-04-20 RX ADMIN — CYANOCOBALAMIN TAB 1000 MCG 1000 MCG: 1000 TAB at 08:07

## 2024-04-20 RX ADMIN — POTASSIUM CHLORIDE 20 MEQ: 1500 TABLET, EXTENDED RELEASE ORAL at 08:07

## 2024-04-20 ASSESSMENT — ACTIVITIES OF DAILY LIVING (ADL)
ADLS_ACUITY_SCORE: 22
ADLS_ACUITY_SCORE: 21
ADLS_ACUITY_SCORE: 22
ADLS_ACUITY_SCORE: 21
ADLS_ACUITY_SCORE: 22
ADLS_ACUITY_SCORE: 21
ADLS_ACUITY_SCORE: 22
ADLS_ACUITY_SCORE: 22

## 2024-04-20 NOTE — CONSULTS
"[facility]  Consult Note - Hospitalist Service  Date of Admission:  4/19/2024  Consult Requested by: Cardiology  Reason for Consult: Postoperative management    Assessment & Plan   Angelica Wright is a 73 year old female with PMH HTN, HLD transferred from [facility] on 4/19/2024 for NSTEMI, underwent cardiac cath today. [facility] consulted for postoperative management    # NSTEMI  Elevated Troponin 181  Transferred from [facility] for cardiac cath    Cardiac cath shows mid RCA 40% stenosed, first diagonal lesion 50% stenosed, proximal LAD 20% stenosed  Echo shows EF 65%.  Mild to moderate mitral insufficiency.  Normal right ventricular systolic function  Continue Asa, statin, metoprolol    # HTN  PTA Amlodipine 5mg, Lisinopril-hydrochlorothiazide 12.5mg  Monitor Blood pressure    # HLD  Statin    # Obesity  BMI 31.18       Clinically Significant Risk Factors Present on Admission        # Hypokalemia: Lowest K = 3.2 mmol/L in last 2 days, will replace as needed    # Hypercalcemia: Highest Ca = 10.4 mg/dL in last 2 days, will monitor as appropriate    # Hypoalbuminemia: Lowest albumin = 3.4 g/dL at 4/19/2024  2:51 AM, will monitor as appropriate     # Hypertension: Noted on problem list      # Obesity: Estimated body mass index is 31.18 kg/m  as calculated from the following:    Height as of 4/18/24: 1.702 m (5' 7\").    Weight as of an earlier encounter on 4/19/24: 90.3 kg (199 lb 1.6 oz).              Jasmyne Gillis MD  Hospitalist Service  Securely message with [employer] (more info)  Text page via [facility] Paging/Directory   ______________________________________________________________________    Chief Complaint   S/p Cardiac cath - post operative management    History is obtained from the patient    History of Present Illness   Angelica Wright is a 73 year old female with PMH HTN, HLD transferred from [facility] on 4/19/2024 for NSTEMI, underwent cardiac cath today. [facility] consulted for postoperative " management      Past Medical History    No past medical history on file.    Past Surgical History   No past surgical history on file.    Medications   I have reviewed this patient's current medications       Review of Systems    The 10 point Review of Systems is negative other than noted in the HPI or here.      Physical Exam   Vital Signs: Temp: 97.7  F (36.5  C) Temp src: Oral BP: (!) 146/75 Pulse: 68   Resp: 18 SpO2: 96 % O2 Device: None (Room air) Oxygen Delivery: 2 LPM  Weight: 0 lbs 0 oz    General:  Appears stated age, no acute distress. A&O x 3.  Skin:  Warm, dry  Chest:  Breath sounds CTA and no increased work of breathing on room air.No rales or wheezes  Cardiovascular:  RRR, no rub or murmur. No peripheral edema.  Abdomen:  Soft, non-tender, non-distended.  Musculoskeletal:  Moves all four extremities. No muscle atrophy.  Neurological:  No gross focal deficits    Medical Decision Making       45 MINUTES SPENT BY ME on the date of service doing chart review, history, exam, documentation & further activities per the note.      Data     I have personally reviewed the following data over the past 24 hrs:    7.5  \   12.5   / 191     142 110 (H) 17.8 /  106 (H)   3.7 23 0.65 \     ALT: 8 AST: 27 AP: 44 TBILI: 0.4   ALB: 3.4 (L) TOT PROTEIN: 6.3 (L) LIPASE: N/A     Trop: 181 (HH) BNP: N/A       Imaging results reviewed over the past 24 hrs:   Recent Results (from the past 24 hour(s))   US Lower Extremity Venous Duplex Bilateral    Narrative    EXAM: US LOWER EXTREMITY VENOUS DUPLEX BILATERAL  LOCATION: [facility]  DATE: 4/19/2024    INDICATION: Leg swelling, left greater than right.  COMPARISON: None.  TECHNIQUE: Venous Duplex ultrasound of bilateral lower extremities with and without compression, augmentation and duplex. Color flow and spectral Doppler with waveform analysis performed.    FINDINGS: Exam includes the common femoral, femoral, popliteal veins as well as segmentally  visualized deep calf veins and greater saphenous vein.     RIGHT: No deep vein thrombosis. No superficial thrombophlebitis.    LEFT: No deep vein thrombosis. No superficial thrombophlebitis.      Impression    IMPRESSION:  1.  No deep venous thrombosis in the bilateral lower extremities.   Echocardiogram Complete    Narrative    [id number]  [id number]  [id number]  [id number]^ALIZA^[place] [address]     Name: MS. SHEA LEE  MRN: [id number]  : 1950  Study Date: 2024 08:44 AM  Age: 73 yrs  Gender: Female  Patient Location: [facility]  Reason For Study: Chest Pain  Ordering Physician: TRISTEN ABRAMS  Performed By: KARINA     BSA: 2.0 m2  Height: 67 in  Weight: 199 lb  HR: 56  BP: 163/77 mmHg  ______________________________________________________________________________  Procedure  Complete Portable Echo Adult.  ______________________________________________________________________________  Interpretation Summary     1. Normal left ventricular size and systolic performance with a visually  estimated ejection fraction of 65%.  2. There is mild, to perhaps mild-moderate, mitral insufficiency.  3. Normal right ventricular size and systolic performance.  4. There is mild left atrial enlargement.  ______________________________________________________________________________  Left ventricle:  Normal left ventricular size and systolic performance with a visually  estimated ejection fraction of 65%. There is normal regional wall motion. Left  ventricular wall thickness is normal.     Assessment of LV Diastolic Function: The cumulative findings suggest normal  diastolic filling [The septal e' velocity is < 7 cm/s & lateral e' velocity  is  < 10 cm/s. The average E/e' is < 14. TR velocity is < 2.8 m/s. Left atrial  volume index is less than 34 mL/mÂ ].     Right ventricle:  Normal right ventricular size and systolic performance.     Left atrium:  There is mild left  atrial enlargement.     Right atrium:  The right atrium is of normal size. There is a slightly prominent eustachian  valve remnant.     IVC:  The IVC is mildly dilated.     Aortic valve:  The aortic valve is comprised of three cusps. No significant aortic stenosis  or aortic insufficiency is detected on this study.     Mitral valve:  The mitral valve appears morphologically normal.There is mild, to perhaps  mild-moderate, mitral insufficiency.     Tricuspid valve:  The tricuspid valve is grossly morphologically normal. There is trace  tricuspid insufficiency.     Pulmonic valve:  The pulmonic valve is grossly morphologically normal.     Thoracic aorta:  The aortic root and proximal ascending aorta are of normal dimension.     Pericardium:  There is no significant pericardial effusion.  ______________________________________________________________________________  ______________________________________________________________________________  MMode/2D Measurements & Calculations  IVSd: 0.91 cm  LVIDd: 3.9 cm  LVIDs: 2.6 cm  LVPWd: 0.79 cm  FS: 34.4 %  LV mass(C)d: 98.3 grams  LV mass(C)dI: 48.7 grams/m2  Ao root diam: 2.9 cm  LA dimension: 4.2 cm  asc Aorta Diam: 3.1 cm  LA/Ao: 1.4  LVOT diam: 2.0 cm  LVOT area: 3.0 cm2  Ao root diam index Ht(cm/m): 1.7  Ao root diam index BSA (cm/m2): 1.5  Asc Ao diam index BSA (cm/m2): 1.5  Asc Ao diam index Ht(cm/m): 1.8  EF Biplane: 68.1 %     LA Volume Indexed (AL/bp): 33.8 ml/m2  RWT: 0.40  TAPSE: 2.4 cm     Time Measurements  MM HR: 60.0 BPM     Doppler Measurements & Calculations  MV E max orlando: 76.4 cm/sec  MV A max [place]: 79.4 cm/sec  MV E/A: 0.96  MV dec time: 0.20 sec  LV V1 max P.6 mmHg  LV V1 max: 107.5 cm/sec  LV V1 VTI: 26.2 cm  SV(LVOT): 79.7 ml  SI(LVOT): 39.5 ml/m2     PA acc time: 0.08 sec  TR max orlando: 241.6 cm/sec  TR max P.4 mmHg  E/E' av.2  Lateral E/e': 11.5  Medial E/e': 11.0  RV S [place]: 12.4 cm/sec      ______________________________________________________________________________  Report approved by: Kim Knapp 04/19/2024 10:08 AM         Cardiac Catheterization    Narrative      Mid RCA lesion is 40% stenosed.    1st Diag lesion is 50% stenosed.    Prox LAD lesion is 20% stenosed.    Left ventricular filling pressures are normal.    1.  Left main is normal  2.  30% narrowing proximal LAD, no severe stenoses.  50% ostial narrowing   of medium sized first diagonal branch.  3.  Codominant left circumflex artery  appears normal.  4.  Codominant right coronary artery supplies PDA only.  Smooth 40%   narrowing mid segment.  5.  No apparent infarct lesion or infarct artery.  No ulcerated plaques   visualized.  6.  LVEDP = 8 mmHg

## 2024-04-20 NOTE — PROGRESS NOTES
Care Management Discharge Note    Discharge Date: 04/20/2024       Discharge Disposition: Home    Discharge Services: None    Discharge DME: None    Discharge Transportation: family or friend will provide    Private pay costs discussed: Not applicable    Does the patient's insurance plan have a 3 day qualifying hospital stay waiver?  No    PAS Confirmation Code:  NA  Patient/family educated on Medicare website which has current facility and service quality ratings: no    Education Provided on the Discharge Plan: Yes per team  Persons Notified of Discharge Plans: Patient per team  Patient/Family in Agreement with the Plan: yes    Handoff Referral Completed: Yes    Additional Information:  Per provider, Dr. Crook, patient is ready for discharge.    Patient discharge to home. Family will transport.    Earnestine Muhammad RN

## 2024-04-20 NOTE — DISCHARGE SUMMARY
"[facility]  Hospitalist Discharge Summary      Date of Admission:  4/19/2024  Date of Discharge:  4/20/2024  Discharging Provider: Karthik Crook DO, DO  Discharge Service: Hospitalist Service    Discharge Diagnoses       Clinically Significant Risk Factors     # Obesity: Estimated body mass index is 30.73 kg/m  as calculated from the following:    Height as of 4/18/24: 1.702 m (5' 7\").    Weight as of this encounter: 89 kg (196 lb 3.2 oz).       Follow-ups Needed After Discharge       Unresulted Labs Ordered in the Past 30 Days of this Admission       No orders found for last 31 day(s).            Discharge Disposition   Discharged to home  Condition at discharge: Stable    Hospital Course     Angelica Wright is a 73 year old female with PMH HTN, HLD transferred from [facility] on 4/19/2024 for NSTEMI, underwent cardiac cath today. [facility] consulted for postoperative management     # NSTEMI  Elevated Troponin 181  Transferred from [facility] for cardiac cath    Cardiac cath shows mid RCA 40% stenosed, first diagonal lesion 50% stenosed, proximal LAD 20% stenosed. No PCI performed.   Echo shows EF 65%.  Mild to moderate mitral insufficiency.  Normal right ventricular systolic function  Asa, statin, metoprolol     # HTN  Amlodipine 5mg, Lisinopril-hydrochlorothiazide 12.5mg. Metoprolol     # HLD  Statin     # Obesity  BMI 31.18    Consultations This Hospital Stay   HOSPITALIST IP CONSULT  CARDIOLOGY IP CONSULT  PHARMACY IP CONSULT  PHARMACY IP CONSULT  SMOKING CESSATION PROGRAM IP CONSULT    Code Status   Full Code    Time Spent on this Encounter          Karthik Crook DO, DO  [facility] HEART CARE  [address] [place] [address]  Phone: [phone]  Fax: [phone]  ______________________________________________________________________    Physical Exam   Vital Signs: Temp: 98.2  F (36.8  C) Temp src: Oral BP: (!) 147/82 Pulse: 62   Resp: 20 " SpO2: 95 % O2 Device: None (Room air) Oxygen Delivery: 2 LPM  Weight: 196 lbs 3.2 oz    Gen nad  Cv rrr  Lungs ctab  Abd bs+, nttp  Neuro nonfocal    Primary Care Physician   [facility]    Discharge Orders      Reason for your hospital stay    NSTEMI     Follow-up and recommended labs and tests     Follow up with primary care provider, [facility], within 7 days for hospital follow- up.  No follow up labs or test are needed.     Activity    Your activity upon discharge: activity as tolerated     Monitor and record    blood pressure daily.  If top number is > 170 or < 90, and/or bottom number > 95 on 3 or more consecutive readings contact your primary care physician for further instructions.     Diet    Follow this diet upon discharge: Orders Placed This Encounter      Advance Diet as Tolerated: Regular Diet Adult; Low Saturated Fat Na <2400mg Diet       Significant Results and Procedures       Discharge Medications   Current Discharge Medication List        START taking these medications    Details   aspirin (ASA) 81 MG chewable tablet Take 1 tablet (81 mg) by mouth daily    Associated Diagnoses: NSTEMI (non-ST elevated myocardial infarction) (H)      metoprolol tartrate (LOPRESSOR) 25 MG tablet Take 0.5 tablets (12.5 mg) by mouth 2 times daily  Qty: 60 tablet, Refills: 0    Associated Diagnoses: NSTEMI (non-ST elevated myocardial infarction) (H)      nitroGLYcerin (NITROSTAT) 0.4 MG sublingual tablet For chest pain place 1 tablet under the tongue every 5 minutes for 3 doses. If symptoms persist 5 minutes after 1st dose call 911.  Qty: 30 tablet, Refills: 0    Associated Diagnoses: NSTEMI (non-ST elevated myocardial infarction) (H)      rosuvastatin (CRESTOR) 20 MG tablet Take 1 tablet (20 mg) by mouth daily  Qty: 30 tablet, Refills: 0    Associated Diagnoses: NSTEMI (non-ST elevated myocardial infarction) (H)           CONTINUE these medications which have NOT CHANGED    Details    amLODIPine (NORVASC) 5 MG tablet Take 1 tablet by mouth daily In the evening      cyanocobalamin (VITAMIN B-12) 1000 MCG tablet Take by mouth once daily.      lisinopril-hydrochlorothiazide (ZESTORETIC) 20-12.5 MG tablet Take 2 tablets by mouth daily In the evening           STOP taking these medications       meloxicam (MOBIC) 7.5 MG tablet Comments:   Reason for Stopping:             Allergies   No Known Allergies

## 2024-04-20 NOTE — PROGRESS NOTES
CARDIOLOGY PROGRESS NOTE      Assessment/Plan:  Troponin elevation-level 1 from 14 up to 64 finishing at 181 prompting angiography showing no significant obstructive disease.  Suspect elevation secondary to benign essential hypertension.  2.  Coronary artery disease-angiography yesterday secondary to above troponin showed normal left main, 30% proximal LAD with a 50% ostial diagonal narrowing, normal circumflex, and right coronary artery with a mid 40% lesion.  No significant obstructive disease and work on prevention.  3.  Benign essential hypertension-blood pressure slightly elevated, metoprolol has been added, would consider this resistant and continue amlodipine, lisinopril/HCTZ, and metoprolol.  4.  Pure hypercholesterolemia-total cholesterol 230 with an LDL of 161 and agree with Crestor.  Will check lipids in approximately 2 months provided Crestor tolerated.    Plan:  1.  Agree with discharge home today.    Discharge Plannin.  Home today on amlodipine, lisinopril/HCTZ, metoprolol as well as Crestor and aspirin.  2.  Will check fasting lipids in approximately 2 months.  3.  Can follow with Dr. Sophia Esquivel primary cardiologist.     LOS: 1 day     Subjective:  Interval History:    73-year-old -American female is being seen on second day of hospitalization here at [facility].  Feels well.  Anxious for discharge. Patient complains of no syncope, dizziness, fatigue, fevers, chest pain, palpitations, shortness of breath, PND, orthopnea, nausea, vomiting, or edema.     Medications  Current Facility-Administered Medications   Medication Dose Route Frequency Provider Last Rate Last Admin    amLODIPine (NORVASC) tablet 5 mg  5 mg Oral Daily Jordan Louie MD   5 mg at 24 08    aspirin (ASA) chewable tablet 162 mg  162 mg Oral Daily Jordan Louie MD   162 mg at 24 08    cyanocobalamin (VITAMIN B-12) tablet 1,000 mcg  1,000 mcg Oral Daily Jordan Louie MD   1,000 mcg at 24 08     lisinopril-hydrochlorothiazide (ZESTORETIC) 20-12.5 MG per tablet 2 tablet  2 tablet Oral QPM Jordan Louie MD   2 tablet at 04/19/24 2036    magnesium oxide (MAG-OX) tablet 400 mg  400 mg Oral Q4H Karthik Crook    400 mg at 04/20/24 0808    metoprolol tartrate (LOPRESSOR) half-tab 12.5 mg  12.5 mg Oral BID Jordan Louie MD   12.5 mg at 04/20/24 0807    rosuvastatin (CRESTOR) tablet 20 mg  20 mg Oral Daily Jordan Louie MD   20 mg at 04/20/24 0808     Objective:   Vital signs in last 24 hours:  Temp:  [97.5  F (36.4  C)-98.4  F (36.9  C)] 98.2  F (36.8  C)  Pulse:  [53-76] 62  Resp:  [10-20] 20  BP: (127-161)/(60-85) 147/82  SpO2:  [95 %-100 %] 95 %    Physical Exam:  BP (!) 147/82 (BP Location: Left arm)   Pulse 62   Temp 98.2  F (36.8  C) (Oral)   Resp 20   Wt 89 kg (196 lb 3.2 oz)   SpO2 95%   BMI 30.73 kg/m      General Appearance:    Alert, cooperative, no distress, appears stated age   Head:    Normocephalic, without obvious abnormality, atraumatic   Throat:   Lips, mucosa, and tongue normal; teeth and gums normal   Neck:   Supple, symmetrical, trachea midline, no adenopathy;        thyroid:  No enlargement/tenderness/nodules; no carotid    bruit or JVD   Back:     Symmetric, no curvature, ROM normal, no CVA tenderness   Lungs:     Clear to auscultation bilaterally, respirations unlabored   Chest wall:    No tenderness or deformity   Heart:    Regular rate and rhythm, S1 and S2 normal, no murmur, rub   or gallop   Abdomen:     Soft, non-tender, bowel sounds active all four quadrants,     no masses, no organomegaly   Extremities:   Normal, atraumatic, no cyanosis or edema   Pulses:   2+ and symmetric all extremities   Skin:   Skin color, texture, turgor normal, no rashes or lesions     Cardiographics:      ECG: Personally reviewed by myself shows sinus rhythm, leftward axis, probable left ventricular hypertrophy with no acute changes.    Echocardiogram:   1. Normal left ventricular size and  "systolic performance with a visually  estimated ejection fraction of 65%.  2. There is mild, to perhaps mild-moderate, mitral insufficiency.  3. Normal right ventricular size and systolic performance.  4. There is mild left atrial enlargement.    Lab Results:   Recent Labs   Lab 04/20/24  0442   WBC 6.3   HGB 13.1   HCT 39.5        Recent Labs   Lab 04/19/24  0251      CO2 23   BUN 17.8   .       No results found for: \"CKTOTAL\", \"CKMB\", \"TROPONINI\"        "

## 2024-04-20 NOTE — PLAN OF CARE
Problem: Cardiac Catheterization (Diagnostic/Interventional)  Goal: Absence of Bleeding  Outcome: Progressing  Goal: Absence of Contrast-Induced Injury  Outcome: Progressing  Goal: Stable Heart Rate and Rhythm  Outcome: Progressing  Goal: Absence of Embolism Signs and Symptoms  Outcome: Progressing  Goal: Anesthesia/Sedation Recovery  Outcome: Progressing  Goal: Optimal Pain Control and Function  Outcome: Progressing  Goal: Absence of Vascular Access Complication  Outcome: Progressing   Goal Outcome Evaluation:  Pt is a/o x4. She had an angiogram today. No intervention. CMS intact to right hand. No bleeding/hematoma from right femoral site. She is up to toilet and eating well.

## 2024-04-20 NOTE — PLAN OF CARE
Assumed care 2300 to 0730. A&O x 4. Independent. Tele is NSR. Denies pain. Room air. Up to toilet. Patient slept for the majority of the shift. Call light within reach, able to make needs known. Bed alarm on for safety.    Problem: Cardiac Catheterization (Diagnostic/Interventional)  Goal: Absence of Vascular Access Complication  Intervention: Prevent and Manage Access Complications  Recent Flowsheet Documentation  Taken 4/20/2024 0430 by Shefali Agosto RN  Activity Management: activity adjusted per tolerance  Head of Bed (HOB) Positioning: HOB at 20-30 degrees  Taken 4/20/2024 0030 by Shefali Agosto RN  Activity Management: activity adjusted per tolerance  Head of Bed (HOB) Positioning: HOB at 20-30 degrees     Problem: Risk for Delirium  Goal: Improved Attention and Thought Clarity  Intervention: Maximize Cognitive Function  Recent Flowsheet Documentation  Taken 4/20/2024 0430 by Shefali Agosto RN  Sensory Stimulation Regulation: care clustered  Reorientation Measures:   clock in view   glasses use encouraged  Taken 4/20/2024 0030 by Shefali Agosto RN  Sensory Stimulation Regulation: care clustered  Reorientation Measures:   clock in view   glasses use encouraged

## 2024-04-22 ENCOUNTER — TELEPHONE (OUTPATIENT)
Dept: CARDIOLOGY | Facility: CLINIC | Age: 74
End: 2024-04-22
Payer: MEDICARE

## 2024-04-22 DIAGNOSIS — R07.9 CHEST PAIN, UNSPECIFIED TYPE: Primary | ICD-10-CM

## 2024-04-22 NOTE — TELEPHONE ENCOUNTER
----- Message from Coby oMody sent at 4/22/2024  8:06 AM CDT -----  CAMRON DECKER, I LVM FOR PATIENT, COULD YOU PLACE THE ORDER? SO THAT I CAN DOCUMENT. THANK YOU, COBY MOODY  ----- Message -----  From: Martell Boogie MD  Sent: 4/20/2024   5:22 PM CDT  To: [facility] Scheduling Registration Pool - e    Lady needs a follow up with Eric in about 2-3 months.  LF

## 2024-04-24 ENCOUNTER — PATIENT OUTREACH (OUTPATIENT)
Dept: CARE COORDINATION | Facility: CLINIC | Age: 74
End: 2024-04-24
Payer: MEDICARE

## 2024-04-24 NOTE — PROGRESS NOTES
Clinic Care Coordination Contact  [facility]: Post-Discharge Note  SITUATION                                                      Admission:    Admission Date: 04/19/24   Reason for Admission: NSTEMI  Discharge:   Discharge Date: 04/20/24  Discharge Diagnosis: NSTEMI    BACKGROUND                                                      Angelica Wright is a 73 year old female with PMH HTN, HLD transferred from [facility] on 4/19/2024 for NSTEMI, underwent cardiac cath today. [facility] consulted for postoperative management     # NSTEMI  Elevated Troponin 181  Transferred from [facility] for cardiac cath    Cardiac cath shows mid RCA 40% stenosed, first diagonal lesion 50% stenosed, proximal LAD 20% stenosed. No PCI performed.   Echo shows EF 65%.  Mild to moderate mitral insufficiency.  Normal right ventricular systolic function  Asa, statin, metoprolol     # HTN  Amlodipine 5mg, Lisinopril-hydrochlorothiazide 12.5mg. Metoprolol     # HLD  Statin     # Obesity  BMI 31.18    ASSESSMENT           Discharge Assessment  How are you doing now that you are home?: Patient shares that she is feeling really good and things seem to be getting back to normal. Cardiology appt already made and pt will plan to call Natalie to make PCP appt(declines assistance) No other questions/concerns or needs at this time and thanks writer for the call  How are your symptoms? (Red Flag symptoms escalate to triage hotline per guidelines): Improved  Do you feel your condition is stable enough to be safe at home until your provider visit?: Yes  Does the patient have their discharge instructions? : Yes  Does the patient have questions regarding their discharge instructions? : No  Were you started on any new medications or were there changes to any of your previous medications? : Yes  Does the patient have all of their medications?: Yes  Do you have questions regarding any of your medications? : No  Do you have all of your needed medical supplies  or equipment (DME)?  (i.e. oxygen tank, CPAP, cane, etc.): Yes  Discharge follow-up appointment scheduled within 14 calendar days? : Yes  Discharge Follow Up Appointment Date: 06/04/24  Discharge Follow Up Appointment Scheduled with?: Specialty Care Provider (Cardiology)        PLAN                                                      Outpatient Plan:  Follow up with primary care provider, [facility], within 7 days for hospital follow- up. No follow up labs or test are needed.     Future Appointments   Date Time Provider Department Center   6/4/2024  8:50 AM Sophia Reyes MD [facility]         For any urgent concerns, please contact our 24 hour nurse triage line: [phone] ([phone])         [facility]

## 2024-06-04 ENCOUNTER — OFFICE VISIT (OUTPATIENT)
Dept: CARDIOLOGY | Facility: CLINIC | Age: 74
End: 2024-06-04
Payer: MEDICARE

## 2024-06-04 VITALS
BODY MASS INDEX: 31.23 KG/M2 | WEIGHT: 199 LBS | DIASTOLIC BLOOD PRESSURE: 82 MMHG | SYSTOLIC BLOOD PRESSURE: 136 MMHG | RESPIRATION RATE: 16 BRPM | HEART RATE: 64 BPM | HEIGHT: 67 IN

## 2024-06-04 DIAGNOSIS — R07.9 CHEST PAIN, UNSPECIFIED TYPE: ICD-10-CM

## 2024-06-04 PROCEDURE — 99214 OFFICE O/P EST MOD 30 MIN: CPT | Performed by: INTERNAL MEDICINE

## 2024-06-04 PROCEDURE — G2211 COMPLEX E/M VISIT ADD ON: HCPCS | Performed by: INTERNAL MEDICINE

## 2024-06-04 NOTE — LETTER
6/4/2024    DEVONTE CHE MD  [address]  [facility] [address]    RE: Angelica Wright       Dear Colleague,     I had the pleasure of seeing Angelica Wright in the [facility].    HEART CARE ENCOUNTER CONSULTATON NOTE      [facility]  [phone]      Assessment/Recommendations   Assessment:  1.  Coronary artery disease: mild to moderate coronary artery on recent coronary angiogram done for elevated troponin and chest pain.  No recurrent chest pain.   2.  Hypertension: now well controlled  3.  Hyperlipidemia: goal LDL < 70 repeat fasting lipids in a few months and if not adequately controlled recommend increasing crestor to 40 mg daily    Plan:  1.  Continue aspirin and crestor 20 mg daily  2.  Continue metoprolol, amlodipine and zestoretic  3.  May follow up in one year         History of Present Illness/Subjective    HPI: Angelica Wright is a 73 year old female with history of hyperlipidemia, hypertension, coronary artery disease with mild to moderate disease on angiogram 4/19/24 here for follow up after recent hospitalization. She presented with chest pain and elevated troponins.  Chest pain resolved without recurrence. Coronary angiogram showed mild to moderate nonobstructive disease. She is very active and has been working on her diet. She walks one to two times a week and on Monday, Wednesday and Fridays watches her active one year old grand daughter.  No exertional complaints.     Echocardiogram 4/19/2024  1. Normal left ventricular size and systolic performance with a visually  estimated ejection fraction of 65%.  2. There is mild, to perhaps mild-moderate, mitral insufficiency.  3. Normal right ventricular size and systolic performance.  4. There is mild left atrial enlargement.    Coronary  Angiogram  4/19/2024    Mid RCA lesion is 40% stenosed.    1st Diag lesion is 50% stenosed.    Prox LAD lesion is 20% stenosed.    Left ventricular filling  "pressures are normal.     1.  Left main is normal  2.  30% narrowing proximal LAD, no severe stenoses.  50% ostial narrowing of medium sized first diagonal branch.  3.  Codominant left circumflex artery  appears normal.  4.  Codominant right coronary artery supplies PDA only.  Smooth 40% narrowing mid segment.  5.  No apparent infarct lesion or infarct artery.  No ulcerated plaques visualized.  6.  LVEDP = 8 mmHg     Physical Examination  Review of Systems   Vitals: /82 (BP Location: Left arm, Patient Position: Sitting, Cuff Size: Adult Large)   Pulse 64   Resp 16   Ht 1.702 m (5' 7\")   Wt 90.3 kg (199 lb)   BMI 31.17 kg/m    BMI= Body mass index is 31.17 kg/m .  Wt Readings from Last 3 Encounters:   06/04/24 90.3 kg (199 lb)   04/20/24 89 kg (196 lb 3.2 oz)   04/19/24 90.3 kg (199 lb 1.6 oz)       General Appearance:   no distress, normal body habitus   ENT/Mouth: membranes moist, no oral lesions or bleeding gums.      EYES:  no scleral icterus, normal conjunctivae   Neck: no carotid bruits or thyromegaly   Chest/Lungs:   lungs are clear to auscultation   Cardiovascular:   Regular. Normal first and second heart sounds with no murmur  no edema bilaterally    Abdomen:  bowel sounds are present   Extremities: no cyanosis or clubbing   Skin: no xanthelasma, warm.    Neurologic: normal  bilateral, no tremors     Psychiatric: alert and oriented x3, calm        Please refer above for cardiac ROS details.        Medical History  Surgical History Family History Social History   Hypertension  dyslipidemia Past Surgical History:   Procedure Laterality Date    CV CORONARY ANGIOGRAM N/A 4/19/2024    Procedure: Coronary Angiogram;  Surgeon: Isidoro Gonzalez MD;  Location: [facility] CATH LAB CV    CV LEFT HEART CATH N/A 4/19/2024    Procedure: Left Heart Catheterization;  Surgeon: Isidoro Gonzalez MD;  Location: [facility] CATH LAB CV     No family history of heart disease   Social History     Socioeconomic History "    Marital status:      Spouse name: Not on file    Number of children: Not on file    Years of education: Not on file    Highest education level: Not on file   Occupational History    Not on file   Tobacco Use    Smoking status: Never    Smokeless tobacco: Never   Substance and Sexual Activity    Alcohol use: Not on file    Drug use: Never    Sexual activity: Not on file   Other Topics Concern    Not on file   Social History Narrative    Not on file     Social Determinants of Health     Financial Resource Strain: Low Risk  (1/29/2024)    [facility]    Financial Resource Strain     Difficulty of Paying Living Expenses: 3     Difficulty of Paying Living Expenses: Not on file   Food Insecurity: No Food Insecurity (1/29/2024)    [facility]    Food Insecurity     Worried About Running Out of Food in the Last Year: 1   Transportation Needs: No Transportation Needs (1/29/2024)    Received from [email or web address] [facility]    Transportation Needs     Lack of Transportation (Medical): 1   Physical Activity: Not on file   Stress: Not on file   Social Connections: Socially Integrated (1/29/2024)    Received from [email or web address] [facility]    Social Connections     Frequency of Communication with Friends and Family: 0   Interpersonal Safety: Not on file   Housing Stability: Low Risk  (1/29/2024)    [facility]    Housing Stability     Unable to Pay for Housing in the Last Year: 1           Medications  Allergies   Current Outpatient Medications   Medication Sig Dispense Refill    amLODIPine (NORVASC) 5 MG tablet Take 1 tablet by mouth daily In the evening      aspirin (ASA) 81 MG chewable tablet Take 1 tablet (81 mg) by mouth daily      cyanocobalamin (VITAMIN B-12) 1000 MCG tablet Take by mouth once daily.      lisinopril-hydrochlorothiazide  "(ZESTORETIC) 20-12.5 MG tablet Take 2 tablets by mouth daily In the evening      metoprolol tartrate (LOPRESSOR) 25 MG tablet Take 0.5 tablets (12.5 mg) by mouth 2 times daily 60 tablet 0    nitroGLYcerin (NITROSTAT) 0.4 MG sublingual tablet For chest pain place 1 tablet under the tongue every 5 minutes for 3 doses. If symptoms persist 5 minutes after 1st dose call 911. 30 tablet 0    rosuvastatin (CRESTOR) 20 MG tablet Take 1 tablet (20 mg) by mouth daily 30 tablet 0     No Known Allergies       Lab Results    Chemistry/lipid CBC Cardiac Enzymes/BNP/TSH/INR   Recent Labs   Lab Test 04/19/24 0251   CHOL 230*   HDL 60   *   TRIG 43     Recent Labs   Lab Test 04/19/24 0251   *     Recent Labs   Lab Test 04/20/24 0442 04/19/24  0251   NA  --  142   POTASSIUM 3.7 3.7   CHLORIDE  --  110*   CO2  --  23   GLC  --  106*   BUN  --  17.8   CR  --  0.65   GFRESTIMATED  --  >90   MELINDA  --  9.1     Recent Labs   Lab Test 04/19/24 0251 04/18/24  1804   CR 0.65 0.88     No results for input(s): \"A1C\" in the last 99256 hours.       Recent Labs   Lab Test 04/20/24 0442   WBC 6.3   HGB 13.1   HCT 39.5   MCV 96        Recent Labs   Lab Test 04/20/24 0442 04/19/24  0251 04/18/24  1804   HGB 13.1 12.5 13.3    No results for input(s): \"TROPONINI\" in the last 98868 hours.  Recent Labs   Lab Test 04/18/24 2029   NTBNPI 78     Recent Labs   Lab Test 04/18/24  1804   TSH 0.98     Recent Labs   Lab Test 04/18/24  1804   INR 0.90        Sophia Reyes MD        Thank you for allowing me to participate in the care of your patient.      Sincerely,     Sophia Reyes MD     [facility]  cc:   Steffany Boogie MD  [address], [address]  [place] [address]      "

## 2024-06-04 NOTE — PROGRESS NOTES
HEART CARE ENCOUNTER CONSULTATON NOTE      [facility]  [phone]      Assessment/Recommendations   Assessment:  1.  Coronary artery disease: mild to moderate coronary artery on recent coronary angiogram done for elevated troponin and chest pain.  No recurrent chest pain.   2.  Hypertension: now well controlled  3.  Hyperlipidemia: goal LDL < 70 repeat fasting lipids in a few months and if not adequately controlled recommend increasing crestor to 40 mg daily    Plan:  1.  Continue aspirin and crestor 20 mg daily  2.  Continue metoprolol, amlodipine and zestoretic  3.  May follow up in one year         History of Present Illness/Subjective    HPI: Angelica Wright is a 73 year old female with history of hyperlipidemia, hypertension, coronary artery disease with mild to moderate disease on angiogram 4/19/24 here for follow up after recent hospitalization. She presented with chest pain and elevated troponins.  Chest pain resolved without recurrence. Coronary angiogram showed mild to moderate nonobstructive disease. She is very active and has been working on her diet. She walks one to two times a week and on Monday, Wednesday and Fridays watches her active one year old grand daughter.  No exertional complaints.     Echocardiogram 4/19/2024  1. Normal left ventricular size and systolic performance with a visually  estimated ejection fraction of 65%.  2. There is mild, to perhaps mild-moderate, mitral insufficiency.  3. Normal right ventricular size and systolic performance.  4. There is mild left atrial enlargement.    Coronary  Angiogram  4/19/2024    Mid RCA lesion is 40% stenosed.    1st Diag lesion is 50% stenosed.    Prox LAD lesion is 20% stenosed.    Left ventricular filling pressures are normal.     1.  Left main is normal  2.  30% narrowing proximal LAD, no severe stenoses.  50% ostial narrowing of medium sized first diagonal branch.  3.  Codominant left circumflex artery  appears  "normal.  4.  Codominant right coronary artery supplies PDA only.  Smooth 40% narrowing mid segment.  5.  No apparent infarct lesion or infarct artery.  No ulcerated plaques visualized.  6.  LVEDP = 8 mmHg     Physical Examination  Review of Systems   Vitals: /82 (BP Location: Left arm, Patient Position: Sitting, Cuff Size: Adult Large)   Pulse 64   Resp 16   Ht 1.702 m (5' 7\")   Wt 90.3 kg (199 lb)   BMI 31.17 kg/m    BMI= Body mass index is 31.17 kg/m .  Wt Readings from Last 3 Encounters:   06/04/24 90.3 kg (199 lb)   04/20/24 89 kg (196 lb 3.2 oz)   04/19/24 90.3 kg (199 lb 1.6 oz)       General Appearance:   no distress, normal body habitus   ENT/Mouth: membranes moist, no oral lesions or bleeding gums.      EYES:  no scleral icterus, normal conjunctivae   Neck: no carotid bruits or thyromegaly   Chest/Lungs:   lungs are clear to auscultation   Cardiovascular:   Regular. Normal first and second heart sounds with no murmur  no edema bilaterally    Abdomen:  bowel sounds are present   Extremities: no cyanosis or clubbing   Skin: no xanthelasma, warm.    Neurologic: normal  bilateral, no tremors     Psychiatric: alert and oriented x3, calm        Please refer above for cardiac ROS details.        Medical History  Surgical History Family History Social History   Hypertension  dyslipidemia Past Surgical History:   Procedure Laterality Date    CV CORONARY ANGIOGRAM N/A 4/19/2024    Procedure: Coronary Angiogram;  Surgeon: Isidoro Gonzalez MD;  Location: [facility] CATH [facility] CV    CV LEFT HEART CATH N/A 4/19/2024    Procedure: Left Heart Catheterization;  Surgeon: Isidoro Gonzalez MD;  Location: [facility] CATH LAB CV     No family history of heart disease   Social History     Socioeconomic History    Marital status:      Spouse name: Not on file    Number of children: Not on file    Years of education: Not on file    Highest education level: Not on file   Occupational History    Not on file "   Tobacco Use    Smoking status: Never    Smokeless tobacco: Never   Substance and Sexual Activity    Alcohol use: Not on file    Drug use: Never    Sexual activity: Not on file   Other Topics Concern    Not on file   Social History Narrative    Not on file     Social Determinants of Health     Financial Resource Strain: Low Risk  (1/29/2024)    Received from [facility]    Financial Resource Strain     Difficulty of Paying Living Expenses: 3     Difficulty of Paying Living Expenses: Not on file   Food Insecurity: No Food Insecurity (1/29/2024)    Received from [facility]    Food Insecurity     Worried About Running Out of Food in the Last Year: 1   Transportation Needs: No Transportation Needs (1/29/2024)    Received from [facility]    Transportation Needs     Lack of Transportation (Medical): 1   Physical Activity: Not on file   Stress: Not on file   Social Connections: Socially Integrated (1/29/2024)    Received from [facility]    Social Connections     Frequency of Communication with Friends and Family: 0   Interpersonal Safety: Not on file   Housing Stability: Low Risk  (1/29/2024)    Received from [facility]    Housing Stability     Unable to Pay for Housing in the Last Year: 1           Medications  Allergies   Current Outpatient Medications   Medication Sig Dispense Refill    amLODIPine (NORVASC) 5 MG tablet Take 1 tablet by mouth daily In the evening      aspirin (ASA) 81 MG chewable tablet Take 1 tablet (81 mg) by mouth daily      cyanocobalamin (VITAMIN B-12) 1000 MCG tablet Take by mouth once daily.      lisinopril-hydrochlorothiazide (ZESTORETIC) 20-12.5 MG tablet Take 2 tablets by mouth daily In the evening      metoprolol tartrate (LOPRESSOR) 25 MG tablet Take 0.5 tablets (12.5 mg) by mouth 2 times daily 60 tablet 0    nitroGLYcerin  "(NITROSTAT) 0.4 MG sublingual tablet For chest pain place 1 tablet under the tongue every 5 minutes for 3 doses. If symptoms persist 5 minutes after 1st dose call 911. 30 tablet 0    rosuvastatin (CRESTOR) 20 MG tablet Take 1 tablet (20 mg) by mouth daily 30 tablet 0     No Known Allergies       Lab Results    Chemistry/lipid CBC Cardiac Enzymes/BNP/TSH/INR   Recent Labs   Lab Test 04/19/24 0251   CHOL 230*   HDL 60   *   TRIG 43     Recent Labs   Lab Test 04/19/24 0251   *     Recent Labs   Lab Test 04/20/24 0442 04/19/24  0251   NA  --  142   POTASSIUM 3.7 3.7   CHLORIDE  --  110*   CO2  --  23   GLC  --  106*   BUN  --  17.8   CR  --  0.65   GFRESTIMATED  --  >90   MELINDA  --  9.1     Recent Labs   Lab Test 04/19/24 0251 04/18/24  1804   CR 0.65 0.88     No results for input(s): \"A1C\" in the last 45676 hours.       Recent Labs   Lab Test 04/20/24 0442   WBC 6.3   HGB 13.1   HCT 39.5   MCV 96        Recent Labs   Lab Test 04/20/24 0442 04/19/24  0251 04/18/24  1804   HGB 13.1 12.5 13.3    No results for input(s): \"TROPONINI\" in the last 12866 hours.  Recent Labs   Lab Test 04/18/24 2029   NTBNPI 78     Recent Labs   Lab Test 04/18/24  1804   TSH 0.98     Recent Labs   Lab Test 04/18/24  1804   INR 0.90        Sophia Reyes MD                                      "

## 2025-04-06 ENCOUNTER — HEALTH MAINTENANCE LETTER (OUTPATIENT)
Age: 75
End: 2025-04-06

## (undated) DEVICE — 6 FR IMPULSE ANGIO DIAG CATH AL1  5 PACK

## (undated) DEVICE — CATH ANGIO INFINITI 3DRC 6FRX100CM 534676T

## (undated) DEVICE — ELECTRODE DEFIB CADENCE 22550R

## (undated) DEVICE — CATH DIAGNOSTIC RADIAL 5FR TIG 4.0

## (undated) DEVICE — MANIFOLD KIT ANGIO AUTOMATED 014613

## (undated) DEVICE — SYR ANGIOGRAPHY MULTIUSE KIT ACIST 014612

## (undated) DEVICE — KIT HAND CONTROL ACIST 014644 AR-P54

## (undated) DEVICE — INTRO SHEATH 4FRX10CM PINNACLE RSS402

## (undated) DEVICE — SHTH INTRO 0.021IN ID 6FR DIA

## (undated) DEVICE — EXCHANGE WIRE .035 260 STAR/JFC/035/260/ M001491681

## (undated) DEVICE — CUSTOM PACK CORONARY SAN5BCRHEA

## (undated) DEVICE — CATH ANGIO SUPERTORQUE AL1 6FRX100CM 532-645

## (undated) DEVICE — SLEEVE TR BAND RADIAL COMPRESSION DEVICE 24CM TRB24-REG

## (undated) DEVICE — CATH ANGIO 6FR X 100CM INFINIT

## (undated) DEVICE — CATH ANGIO INFINITI 3DRC 4FRX100CM 538476

## (undated) DEVICE — VALVE HEMOSTASIS .096" COPILOT MECH 1003331

## (undated) DEVICE — Device

## (undated) DEVICE — INTRO SHEATH 6FRX10CM PINNACLE RSS602

## (undated) RX ORDER — DIAZEPAM 5 MG
TABLET ORAL
Status: DISPENSED
Start: 2024-04-19

## (undated) RX ORDER — FENTANYL CITRATE 50 UG/ML
INJECTION, SOLUTION INTRAMUSCULAR; INTRAVENOUS
Status: DISPENSED
Start: 2024-04-19